# Patient Record
Sex: FEMALE | URBAN - METROPOLITAN AREA
[De-identification: names, ages, dates, MRNs, and addresses within clinical notes are randomized per-mention and may not be internally consistent; named-entity substitution may affect disease eponyms.]

---

## 2021-05-29 ENCOUNTER — RECORDS - HEALTHEAST (OUTPATIENT)
Dept: ADMINISTRATIVE | Facility: CLINIC | Age: 41
End: 2021-05-29

## 2024-12-04 ENCOUNTER — HOSPITAL ENCOUNTER (INPATIENT)
Facility: CLINIC | Age: 44
End: 2024-12-04
Attending: EMERGENCY MEDICINE | Admitting: INTERNAL MEDICINE
Payer: COMMERCIAL

## 2024-12-04 ENCOUNTER — APPOINTMENT (OUTPATIENT)
Dept: CT IMAGING | Facility: CLINIC | Age: 44
End: 2024-12-04
Attending: EMERGENCY MEDICINE
Payer: COMMERCIAL

## 2024-12-04 DIAGNOSIS — G03.0 ASEPTIC MENINGITIS: Primary | ICD-10-CM

## 2024-12-04 DIAGNOSIS — G43.811 OTHER MIGRAINE WITH STATUS MIGRAINOSUS, INTRACTABLE: ICD-10-CM

## 2024-12-04 DIAGNOSIS — I60.9 SUBARACHNOID HEMORRHAGE (H): ICD-10-CM

## 2024-12-04 LAB
ANION GAP SERPL CALCULATED.3IONS-SCNC: 11 MMOL/L (ref 7–15)
BASOPHILS # BLD AUTO: 0 10E3/UL (ref 0–0.2)
BASOPHILS NFR BLD AUTO: 0 %
BUN SERPL-MCNC: 12.2 MG/DL (ref 6–20)
CALCIUM SERPL-MCNC: 9.7 MG/DL (ref 8.8–10.4)
CHLORIDE SERPL-SCNC: 101 MMOL/L (ref 98–107)
CREAT SERPL-MCNC: 1.12 MG/DL (ref 0.51–0.95)
EGFRCR SERPLBLD CKD-EPI 2021: 61 ML/MIN/1.73M2
EOSINOPHIL # BLD AUTO: 0 10E3/UL (ref 0–0.7)
EOSINOPHIL NFR BLD AUTO: 1 %
ERYTHROCYTE [DISTWIDTH] IN BLOOD BY AUTOMATED COUNT: 11.5 % (ref 10–15)
GLUCOSE SERPL-MCNC: 99 MG/DL (ref 70–99)
HCO3 SERPL-SCNC: 27 MMOL/L (ref 22–29)
HCT VFR BLD AUTO: 39.9 % (ref 35–47)
HGB BLD-MCNC: 13.3 G/DL (ref 11.7–15.7)
IMM GRANULOCYTES # BLD: 0 10E3/UL
IMM GRANULOCYTES NFR BLD: 0 %
LYMPHOCYTES # BLD AUTO: 1.8 10E3/UL (ref 0.8–5.3)
LYMPHOCYTES NFR BLD AUTO: 29 %
MCH RBC QN AUTO: 31.4 PG (ref 26.5–33)
MCHC RBC AUTO-ENTMCNC: 33.3 G/DL (ref 31.5–36.5)
MCV RBC AUTO: 94 FL (ref 78–100)
MONOCYTES # BLD AUTO: 0.3 10E3/UL (ref 0–1.3)
MONOCYTES NFR BLD AUTO: 5 %
NEUTROPHILS # BLD AUTO: 4 10E3/UL (ref 1.6–8.3)
NEUTROPHILS NFR BLD AUTO: 64 %
NRBC # BLD AUTO: 0 10E3/UL
NRBC BLD AUTO-RTO: 0 /100
PLATELET # BLD AUTO: 169 10E3/UL (ref 150–450)
POTASSIUM SERPL-SCNC: 4.1 MMOL/L (ref 3.4–5.3)
RBC # BLD AUTO: 4.23 10E6/UL (ref 3.8–5.2)
SODIUM SERPL-SCNC: 139 MMOL/L (ref 135–145)
WBC # BLD AUTO: 6.2 10E3/UL (ref 4–11)

## 2024-12-04 PROCEDURE — 82310 ASSAY OF CALCIUM: CPT | Performed by: EMERGENCY MEDICINE

## 2024-12-04 PROCEDURE — 84157 ASSAY OF PROTEIN OTHER: CPT | Performed by: EMERGENCY MEDICINE

## 2024-12-04 PROCEDURE — 87015 SPECIMEN INFECT AGNT CONCNTJ: CPT | Performed by: EMERGENCY MEDICINE

## 2024-12-04 PROCEDURE — 86140 C-REACTIVE PROTEIN: CPT | Performed by: INTERNAL MEDICINE

## 2024-12-04 PROCEDURE — 82945 GLUCOSE OTHER FLUID: CPT | Performed by: EMERGENCY MEDICINE

## 2024-12-04 PROCEDURE — 250N000011 HC RX IP 250 OP 636: Performed by: EMERGENCY MEDICINE

## 2024-12-04 PROCEDURE — 89051 BODY FLUID CELL COUNT: CPT | Performed by: EMERGENCY MEDICINE

## 2024-12-04 PROCEDURE — 99291 CRITICAL CARE FIRST HOUR: CPT | Mod: 25

## 2024-12-04 PROCEDURE — 84520 ASSAY OF UREA NITROGEN: CPT | Performed by: EMERGENCY MEDICINE

## 2024-12-04 PROCEDURE — 87205 SMEAR GRAM STAIN: CPT | Performed by: EMERGENCY MEDICINE

## 2024-12-04 PROCEDURE — 009U3ZX DRAINAGE OF SPINAL CANAL, PERCUTANEOUS APPROACH, DIAGNOSTIC: ICD-10-PCS | Performed by: EMERGENCY MEDICINE

## 2024-12-04 PROCEDURE — 70496 CT ANGIOGRAPHY HEAD: CPT

## 2024-12-04 PROCEDURE — 36415 COLL VENOUS BLD VENIPUNCTURE: CPT | Performed by: EMERGENCY MEDICINE

## 2024-12-04 PROCEDURE — 258N000003 HC RX IP 258 OP 636: Performed by: EMERGENCY MEDICINE

## 2024-12-04 PROCEDURE — 80048 BASIC METABOLIC PNL TOTAL CA: CPT | Performed by: EMERGENCY MEDICINE

## 2024-12-04 PROCEDURE — 250N000013 HC RX MED GY IP 250 OP 250 PS 637: Performed by: EMERGENCY MEDICINE

## 2024-12-04 PROCEDURE — 96365 THER/PROPH/DIAG IV INF INIT: CPT | Mod: 59

## 2024-12-04 PROCEDURE — 85025 COMPLETE CBC W/AUTO DIFF WBC: CPT | Performed by: EMERGENCY MEDICINE

## 2024-12-04 PROCEDURE — 87483 CNS DNA AMP PROBE TYPE 12-25: CPT | Performed by: EMERGENCY MEDICINE

## 2024-12-04 PROCEDURE — 70450 CT HEAD/BRAIN W/O DYE: CPT

## 2024-12-04 PROCEDURE — 96375 TX/PRO/DX INJ NEW DRUG ADDON: CPT

## 2024-12-04 PROCEDURE — 62270 DX LMBR SPI PNXR: CPT

## 2024-12-04 PROCEDURE — 250N000009 HC RX 250: Performed by: EMERGENCY MEDICINE

## 2024-12-04 RX ORDER — LEVETIRACETAM 500 MG/1
500 TABLET ORAL EVERY EVENING
COMMUNITY
Start: 2024-06-10

## 2024-12-04 RX ORDER — LEVETIRACETAM 250 MG/1
250 TABLET ORAL EVERY MORNING
COMMUNITY
Start: 2024-06-10

## 2024-12-04 RX ORDER — METOCLOPRAMIDE HYDROCHLORIDE 5 MG/ML
10 INJECTION INTRAMUSCULAR; INTRAVENOUS ONCE
Status: COMPLETED | OUTPATIENT
Start: 2024-12-04 | End: 2024-12-04

## 2024-12-04 RX ORDER — ACETAMINOPHEN 500 MG
1000 TABLET ORAL ONCE
Status: COMPLETED | OUTPATIENT
Start: 2024-12-04 | End: 2024-12-04

## 2024-12-04 RX ORDER — DEXAMETHASONE SODIUM PHOSPHATE 10 MG/ML
10 INJECTION, SOLUTION INTRAMUSCULAR; INTRAVENOUS ONCE
Status: COMPLETED | OUTPATIENT
Start: 2024-12-04 | End: 2024-12-04

## 2024-12-04 RX ORDER — IOPAMIDOL 755 MG/ML
67 INJECTION, SOLUTION INTRAVASCULAR ONCE
Status: COMPLETED | OUTPATIENT
Start: 2024-12-04 | End: 2024-12-04

## 2024-12-04 RX ORDER — PROGESTERONE 100 MG/1
100 CAPSULE ORAL AT BEDTIME
COMMUNITY
Start: 2024-12-03

## 2024-12-04 RX ADMIN — DEXAMETHASONE SODIUM PHOSPHATE 10 MG: 10 INJECTION, SOLUTION INTRAMUSCULAR; INTRAVENOUS at 20:25

## 2024-12-04 RX ADMIN — MAGNESIUM SULFATE HEPTAHYDRATE 1 G: 500 INJECTION, SOLUTION INTRAMUSCULAR; INTRAVENOUS at 20:51

## 2024-12-04 RX ADMIN — SODIUM CHLORIDE 500 ML: 9 INJECTION, SOLUTION INTRAVENOUS at 20:25

## 2024-12-04 RX ADMIN — IOPAMIDOL 67 ML: 755 INJECTION, SOLUTION INTRAVENOUS at 20:48

## 2024-12-04 RX ADMIN — METOCLOPRAMIDE 10 MG: 5 INJECTION, SOLUTION INTRAMUSCULAR; INTRAVENOUS at 20:25

## 2024-12-04 RX ADMIN — SODIUM CHLORIDE 90 ML: 9 INJECTION, SOLUTION INTRAVENOUS at 20:42

## 2024-12-04 RX ADMIN — ACETAMINOPHEN 1000 MG: 500 TABLET, FILM COATED ORAL at 20:24

## 2024-12-04 ASSESSMENT — ACTIVITIES OF DAILY LIVING (ADL)
ADLS_ACUITY_SCORE: 41

## 2024-12-04 ASSESSMENT — COLUMBIA-SUICIDE SEVERITY RATING SCALE - C-SSRS
2. HAVE YOU ACTUALLY HAD ANY THOUGHTS OF KILLING YOURSELF IN THE PAST MONTH?: NO
6. HAVE YOU EVER DONE ANYTHING, STARTED TO DO ANYTHING, OR PREPARED TO DO ANYTHING TO END YOUR LIFE?: NO
1. IN THE PAST MONTH, HAVE YOU WISHED YOU WERE DEAD OR WISHED YOU COULD GO TO SLEEP AND NOT WAKE UP?: NO

## 2024-12-04 NOTE — LETTER
[facility] NEUROSCIENCE UNIT  [address] [place] [address]  Phone: [phone]    December 8, 2024        Keerthi Riddle  [address] [address]          To whom it may concern:    RE: Keerthi Riddle    Patient was seen and treated at our hospital 12/5-12/8/2024. She is medically stable to discharge home, but will need ongoing assistance from her  until she is able to mobilize independently.     Please contact me for questions or concerns.      Sincerely,    Caprice Jama MD

## 2024-12-05 ENCOUNTER — APPOINTMENT (OUTPATIENT)
Dept: MRI IMAGING | Facility: CLINIC | Age: 44
End: 2024-12-05
Attending: EMERGENCY MEDICINE
Payer: COMMERCIAL

## 2024-12-05 VITALS
OXYGEN SATURATION: 100 % | RESPIRATION RATE: 16 BRPM | DIASTOLIC BLOOD PRESSURE: 78 MMHG | HEART RATE: 61 BPM | HEIGHT: 66 IN | TEMPERATURE: 97.6 F | WEIGHT: 156 LBS | BODY MASS INDEX: 25.07 KG/M2 | SYSTOLIC BLOOD PRESSURE: 120 MMHG

## 2024-12-05 PROBLEM — I60.9 SUBARACHNOID HEMORRHAGE (H): Status: ACTIVE | Noted: 2024-12-05

## 2024-12-05 LAB
% BASOPHILS, CSF: 1 %
ALBUMIN UR-MCNC: NEGATIVE MG/DL
APPEARANCE CSF: ABNORMAL
APPEARANCE CSF: ABNORMAL
APPEARANCE UR: CLEAR
BILIRUB UR QL STRIP: NEGATIVE
C GATTII+NEOFOR DNA CSF QL NAA+NON-PROBE: NEGATIVE
CMV DNA CSF QL NAA+NON-PROBE: NEGATIVE
COLOR CSF: ABNORMAL
COLOR CSF: ABNORMAL
COLOR UR AUTO: ABNORMAL
CRP SERPL-MCNC: <3 MG/L
E COLI K1 AG CSF QL: NEGATIVE
EV RNA SPEC QL NAA+PROBE: NEGATIVE
GLUCOSE CSF-MCNC: 48 MG/DL (ref 40–70)
GLUCOSE UR STRIP-MCNC: NEGATIVE MG/DL
GP B STREP DNA CSF QL NAA+NON-PROBE: NEGATIVE
GRAM STAIN RESULT: NORMAL
GRAM STAIN RESULT: NORMAL
HAEM INFLU DNA CSF QL NAA+NON-PROBE: NEGATIVE
HGB UR QL STRIP: NEGATIVE
HHV6 DNA CSF QL NAA+NON-PROBE: NEGATIVE
HSV1 DNA CSF QL NAA+NON-PROBE: NEGATIVE
HSV2 DNA CSF QL NAA+NON-PROBE: NEGATIVE
KETONES UR STRIP-MCNC: NEGATIVE MG/DL
L MONOCYTOG DNA CSF QL NAA+NON-PROBE: NEGATIVE
LEUKOCYTE ESTERASE UR QL STRIP: NEGATIVE
LYMPH ABN NFR CSF MANUAL: 69 %
LYMPH ABN NFR CSF MANUAL: 73 %
MONOS+MACROS NFR CSF MANUAL: 19 %
MONOS+MACROS NFR CSF MANUAL: 23 %
N MEN DNA CSF QL NAA+NON-PROBE: NEGATIVE
NEUTROPHILS NFR CSF MANUAL: 7 %
NEUTROPHILS NFR CSF MANUAL: 8 %
NITRATE UR QL: NEGATIVE
PARECHOVIRUS A RNA CSF QL NAA+NON-PROBE: NEGATIVE
PH UR STRIP: 7.5 [PH] (ref 5–7)
PROT CSF-MCNC: 56.6 MG/DL (ref 15–45)
RBC # CSF MANUAL: 4000 /UL (ref 0–2)
RBC # CSF MANUAL: 4000 /UL (ref 0–2)
S PNEUM DNA CSF QL NAA+NON-PROBE: NEGATIVE
SP GR UR STRIP: 1.03 (ref 1–1.03)
TUBE # CSF: 1
TUBE # CSF: 4
UROBILINOGEN UR STRIP-MCNC: NORMAL MG/DL
VZV DNA CSF QL NAA+NON-PROBE: NEGATIVE
WBC # CSF MANUAL: 134 /UL (ref 0–5)
WBC # CSF MANUAL: 151 /UL (ref 0–5)

## 2024-12-05 PROCEDURE — 81025 URINE PREGNANCY TEST: CPT | Performed by: STUDENT IN AN ORGANIZED HEALTH CARE EDUCATION/TRAINING PROGRAM

## 2024-12-05 PROCEDURE — 250N000011 HC RX IP 250 OP 636: Performed by: EMERGENCY MEDICINE

## 2024-12-05 PROCEDURE — 255N000002 HC RX 255 OP 636: Performed by: EMERGENCY MEDICINE

## 2024-12-05 PROCEDURE — 70544 MR ANGIOGRAPHY HEAD W/O DYE: CPT

## 2024-12-05 PROCEDURE — 99223 1ST HOSP IP/OBS HIGH 75: CPT | Performed by: INTERNAL MEDICINE

## 2024-12-05 PROCEDURE — 81003 URINALYSIS AUTO W/O SCOPE: CPT | Performed by: INTERNAL MEDICINE

## 2024-12-05 PROCEDURE — A9585 GADOBUTROL INJECTION: HCPCS | Performed by: EMERGENCY MEDICINE

## 2024-12-05 PROCEDURE — 258N000003 HC RX IP 258 OP 636: Performed by: INTERNAL MEDICINE

## 2024-12-05 PROCEDURE — 70553 MRI BRAIN STEM W/O & W/DYE: CPT

## 2024-12-05 PROCEDURE — 120N000001 HC R&B MED SURG/OB

## 2024-12-05 PROCEDURE — 250N000013 HC RX MED GY IP 250 OP 250 PS 637: Performed by: INTERNAL MEDICINE

## 2024-12-05 PROCEDURE — 70549 MR ANGIOGRAPH NECK W/O&W/DYE: CPT

## 2024-12-05 PROCEDURE — 70546 MR ANGIOGRAPH HEAD W/O&W/DYE: CPT

## 2024-12-05 PROCEDURE — 99207 PR NO BILLABLE SERVICE THIS VISIT: CPT | Performed by: HOSPITALIST

## 2024-12-05 RX ORDER — ACETAMINOPHEN 325 MG/1
650 TABLET ORAL EVERY 4 HOURS PRN
Status: DISCONTINUED | OUTPATIENT
Start: 2024-12-05 | End: 2024-12-08 | Stop reason: HOSPADM

## 2024-12-05 RX ORDER — NALOXONE HYDROCHLORIDE 0.4 MG/ML
0.4 INJECTION, SOLUTION INTRAMUSCULAR; INTRAVENOUS; SUBCUTANEOUS
Status: DISCONTINUED | OUTPATIENT
Start: 2024-12-05 | End: 2024-12-08 | Stop reason: HOSPADM

## 2024-12-05 RX ORDER — ACETAMINOPHEN 650 MG/1
650 SUPPOSITORY RECTAL EVERY 4 HOURS PRN
Status: DISCONTINUED | OUTPATIENT
Start: 2024-12-05 | End: 2024-12-05

## 2024-12-05 RX ORDER — OXYCODONE HYDROCHLORIDE 5 MG/1
5 TABLET ORAL EVERY 4 HOURS PRN
Status: DISCONTINUED | OUTPATIENT
Start: 2024-12-05 | End: 2024-12-08 | Stop reason: HOSPADM

## 2024-12-05 RX ORDER — HYDRALAZINE HYDROCHLORIDE 20 MG/ML
10 INJECTION INTRAMUSCULAR; INTRAVENOUS EVERY 4 HOURS PRN
Status: DISCONTINUED | OUTPATIENT
Start: 2024-12-05 | End: 2024-12-08 | Stop reason: HOSPADM

## 2024-12-05 RX ORDER — AMOXICILLIN 250 MG
1 CAPSULE ORAL 2 TIMES DAILY PRN
Status: DISCONTINUED | OUTPATIENT
Start: 2024-12-05 | End: 2024-12-08 | Stop reason: HOSPADM

## 2024-12-05 RX ORDER — POLYETHYLENE GLYCOL 3350 17 G/17G
17 POWDER, FOR SOLUTION ORAL 2 TIMES DAILY PRN
Status: DISCONTINUED | OUTPATIENT
Start: 2024-12-05 | End: 2024-12-08 | Stop reason: HOSPADM

## 2024-12-05 RX ORDER — LEVETIRACETAM 500 MG/1
500 TABLET ORAL AT BEDTIME
Status: DISCONTINUED | OUTPATIENT
Start: 2024-12-05 | End: 2024-12-08 | Stop reason: HOSPADM

## 2024-12-05 RX ORDER — PROCHLORPERAZINE MALEATE 10 MG
10 TABLET ORAL EVERY 6 HOURS PRN
Status: DISCONTINUED | OUTPATIENT
Start: 2024-12-05 | End: 2024-12-08 | Stop reason: HOSPADM

## 2024-12-05 RX ORDER — ONDANSETRON 4 MG/1
4 TABLET, ORALLY DISINTEGRATING ORAL EVERY 6 HOURS PRN
Status: DISCONTINUED | OUTPATIENT
Start: 2024-12-05 | End: 2024-12-08 | Stop reason: HOSPADM

## 2024-12-05 RX ORDER — HYDROMORPHONE HYDROCHLORIDE 1 MG/ML
0.3 INJECTION, SOLUTION INTRAMUSCULAR; INTRAVENOUS; SUBCUTANEOUS
Status: DISCONTINUED | OUTPATIENT
Start: 2024-12-05 | End: 2024-12-08 | Stop reason: HOSPADM

## 2024-12-05 RX ORDER — ACETAMINOPHEN 650 MG/1
650 SUPPOSITORY RECTAL EVERY 4 HOURS PRN
Status: DISCONTINUED | OUTPATIENT
Start: 2024-12-05 | End: 2024-12-08 | Stop reason: HOSPADM

## 2024-12-05 RX ORDER — ANTIARTHRITIC COMBINATION NO.2 900 MG
5000 TABLET ORAL
COMMUNITY

## 2024-12-05 RX ORDER — HYDROMORPHONE HYDROCHLORIDE 1 MG/ML
0.5 INJECTION, SOLUTION INTRAMUSCULAR; INTRAVENOUS; SUBCUTANEOUS
Status: DISCONTINUED | OUTPATIENT
Start: 2024-12-05 | End: 2024-12-08 | Stop reason: HOSPADM

## 2024-12-05 RX ORDER — MULTIVIT WITH MINERALS/LUTEIN
1000 TABLET ORAL DAILY
COMMUNITY

## 2024-12-05 RX ORDER — LEVETIRACETAM 250 MG/1
250 TABLET ORAL EVERY MORNING
Status: DISCONTINUED | OUTPATIENT
Start: 2024-12-05 | End: 2024-12-08 | Stop reason: HOSPADM

## 2024-12-05 RX ORDER — ACETAMINOPHEN 325 MG/1
650 TABLET ORAL EVERY 4 HOURS PRN
Status: DISCONTINUED | OUTPATIENT
Start: 2024-12-05 | End: 2024-12-05

## 2024-12-05 RX ORDER — IBUPROFEN 200 MG
600 TABLET ORAL EVERY 4 HOURS PRN
COMMUNITY

## 2024-12-05 RX ORDER — BISACODYL 10 MG
10 SUPPOSITORY, RECTAL RECTAL DAILY PRN
Status: DISCONTINUED | OUTPATIENT
Start: 2024-12-05 | End: 2024-12-08 | Stop reason: HOSPADM

## 2024-12-05 RX ORDER — AMOXICILLIN 250 MG
2 CAPSULE ORAL 2 TIMES DAILY PRN
Status: DISCONTINUED | OUTPATIENT
Start: 2024-12-05 | End: 2024-12-08 | Stop reason: HOSPADM

## 2024-12-05 RX ORDER — LIDOCAINE 40 MG/G
CREAM TOPICAL
Status: DISCONTINUED | OUTPATIENT
Start: 2024-12-05 | End: 2024-12-08 | Stop reason: HOSPADM

## 2024-12-05 RX ORDER — NALOXONE HYDROCHLORIDE 0.4 MG/ML
0.2 INJECTION, SOLUTION INTRAMUSCULAR; INTRAVENOUS; SUBCUTANEOUS
Status: DISCONTINUED | OUTPATIENT
Start: 2024-12-05 | End: 2024-12-08 | Stop reason: HOSPADM

## 2024-12-05 RX ORDER — GADOBUTROL 604.72 MG/ML
7 INJECTION INTRAVENOUS ONCE
Status: COMPLETED | OUTPATIENT
Start: 2024-12-05 | End: 2024-12-05

## 2024-12-05 RX ORDER — ACETAMINOPHEN 325 MG/1
650 TABLET ORAL EVERY 6 HOURS PRN
COMMUNITY

## 2024-12-05 RX ORDER — LORAZEPAM 2 MG/ML
2 INJECTION INTRAMUSCULAR ONCE
Status: COMPLETED | OUTPATIENT
Start: 2024-12-05 | End: 2024-12-05

## 2024-12-05 RX ORDER — ONDANSETRON 2 MG/ML
4 INJECTION INTRAMUSCULAR; INTRAVENOUS EVERY 6 HOURS PRN
Status: DISCONTINUED | OUTPATIENT
Start: 2024-12-05 | End: 2024-12-08 | Stop reason: HOSPADM

## 2024-12-05 RX ORDER — PROGESTERONE 100 MG/1
100 CAPSULE ORAL AT BEDTIME
Status: DISCONTINUED | OUTPATIENT
Start: 2024-12-05 | End: 2024-12-08 | Stop reason: HOSPADM

## 2024-12-05 RX ADMIN — ACETAMINOPHEN 650 MG: 325 TABLET, FILM COATED ORAL at 23:22

## 2024-12-05 RX ADMIN — SODIUM CHLORIDE, POTASSIUM CHLORIDE, SODIUM LACTATE AND CALCIUM CHLORIDE 1000 ML: 600; 310; 30; 20 INJECTION, SOLUTION INTRAVENOUS at 06:24

## 2024-12-05 RX ADMIN — LEVETIRACETAM 250 MG: 250 TABLET, FILM COATED ORAL at 09:31

## 2024-12-05 RX ADMIN — LORAZEPAM 2 MG: 2 INJECTION INTRAMUSCULAR; INTRAVENOUS at 01:44

## 2024-12-05 RX ADMIN — LEVETIRACETAM 500 MG: 500 TABLET, FILM COATED ORAL at 21:37

## 2024-12-05 RX ADMIN — GADOBUTROL 7 ML: 604.72 INJECTION INTRAVENOUS at 02:46

## 2024-12-05 RX ADMIN — PROGESTERONE 100 MG: 100 CAPSULE ORAL at 23:20

## 2024-12-05 ASSESSMENT — ACTIVITIES OF DAILY LIVING (ADL)
ADLS_ACUITY_SCORE: 15
ADLS_ACUITY_SCORE: 15
ADLS_ACUITY_SCORE: 16
ADLS_ACUITY_SCORE: 16
ADLS_ACUITY_SCORE: 15
ADLS_ACUITY_SCORE: 41
ADLS_ACUITY_SCORE: 41
ADLS_ACUITY_SCORE: 15
ADLS_ACUITY_SCORE: 16
ADLS_ACUITY_SCORE: 15
ADLS_ACUITY_SCORE: 16
ADLS_ACUITY_SCORE: 15
ADLS_ACUITY_SCORE: 16
ADLS_ACUITY_SCORE: 41
ADLS_ACUITY_SCORE: 16

## 2024-12-05 NOTE — H&P
[facility]    History and Physical - Hospitalist Service       Date of Admission:  12/4/2024    Assessment & Plan      Keerthi Riddle is a 44 year old female with a history of seizures admitted on 12/4/2024 with over 1 week of headache associated initially with acute onset and intense pain with nausea and emesis, whooshing tinnitus intermittently, neck stiffness which has been improving, some sensation of tightness in her hamstrings when standing without clear sciatica, positional worsening of headache and lower extremity symptoms when standing.  Found to have xanthochromic CSF.  Multiple imaging studies negative for acute pathology.    Headache with improving meningismus: Thunderclap headache, possible occult ICH.  Sudden onset thunderclap headache on November 26.  History of nocturnal seizures, but no recent seizures reported and has been adherent to her Keppra prescriptions.  No fevers.  Symptoms worsen if she is sitting or standing for a prolonged period of time.  Xanthochromic lumbar puncture.  Gram stain negative.  -UA with micro, add on CRP.  Low suspicion for vasculitis with negative imaging, but if hematuria and elevated CRP, might be more concerning for systemic vasculitic process.  -Systolic blood pressure goal less than 150.  Has been normotensive  -Meningitis panel pending.  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  HSV meningitis can also be associated with some sacral and lumbar radiculopathies.  Unclear of benefit of antivirals now 1 week out from symptoms, but would likely initiate if positive studies  -Stroke neurology consulted.  Aware of patient from the emergency department.  All imaging findings are reassuring.  With patient's positional symptoms, question if she might benefit from a blood patch empirically, but would await meningitis/HSV panel first.  This was discussed  "with patient on admission as well.  -1 L LR bolus.  -Acetaminophen, oxycodone, IV Dilaudid available as needed for pain control  -IV hydralazine available if needed for systolic blood pressure greater than 150.    Seizure disorder: Has had witnessed tonic-clonic events in 2019 with posturing and postictal state consistent with seizure.  Normal EEG at that time.  Has also had some subsequent parasomnia versus petit mal seizures.  Follows with neurology as an outpatient and is maintained on Keppra with no recurrent episodes when she has been compliant with her dosing schedule  -Continue Keppra 500 mg at bedtime, 250 mg every morning              Diet:  Regular adult diet  DVT Prophylaxis: Pneumatic Compression Devices  [place] Catheter: Not present  Lines: None     Cardiac Monitoring: None  Code Status:  Full code    Clinically Significant Risk Factors Present on Admission                             # Overweight: Estimated body mass index is 25.18 kg/m  as calculated from the following:    Height as of this encounter: 1.676 m (5' 6\").    Weight as of this encounter: 70.8 kg (156 lb).              Disposition Plan     Medically Ready for Discharge: Anticipated Today pending symptom improvement, neurology consultation, return of CSF panel, possible blood patch           Zack Pompa MD  Hospitalist Service  [facility]  Securely message with [employer] (more info)  Text page via [facility] Paging/Directory     ______________________________________________________________________    Chief Complaint   Headache    History is obtained from the patient, chart review, discussion with Dr. Interiano in the emergency department, review of outside records including recent follow-up through [facility] system    History of Present Illness   Keerthi Riddle is a 44 year old female who presents to [facility] department for evaluation of headache since November 26.    Keerthi has " a history of nocturnal seizures for which she is maintained on Keppra, and while on Keppra has not had issues with seizures.  Seizures initially diagnosed in 2019 with some overlap concern of potential alcohol abuse at that time.    Patient had new onset headache starting Tuesday, November 26.  Sudden onset, not associated with trauma or activity.    Reports she had pain throughout her whole head, primarily bifrontal and at base of neck.  With persistent waxing and waning headache since November 26, she contacted the nurse line through her [facility] system on December 1, was seen December 2 at urgent care and prescribed acetaminophen, December 3 for follow-up with her primary care provider.  At December 3 visit describes some stabbing pain behind her left eye and tingling of her scalp as well as tightness in her hamstring when she would have headache pain.  Was rotating Advil 3 600 mg with Tylenol 650 mg every 6 hours.  At that visit she also reported having a stiff neck with her headache, noted a plan to follow with a chiropractor after PCP visit.  She received Toradol for her headache.    At chiropractor visit integrative health visit, it was noted that her headache was associated with an aura.  Came on while her head was flexed while dying her hair, and with her abrupt pain she subsequently had an episode of emesis.    After chiropractor visit, she was placed in position with her head rotated and extended for 30 seconds and reported feeling dizzy.  Was felt to be a contraindication for cervical spine manipulation given positive vertebral artery test.  She also noted pulsations with her headache and a bilateral sciatica type pain.  Chiropractor recommended follow-up with neurology again with her sudden onset headache symptoms.    When she was able to get her neurology visit scheduled, it was not available for 2 weeks.  Contacted her family medicine clinic 12/4/2024 regarding this, and subsequently presented to the  emergency department for evaluation.    In the emergency department she had a CT which was unrevealing, underwent lumbar puncture with red cells throughout CSF.  Stroke neurology was contacted and recommended imaging studies including MRI, MRV.  These were all unrevealing for any occult bleeding source or venous thrombosis.  No evidence of ischemia, mass, hemorrhage either intraparenchymal or intraventricular, no dissection, no significant stenoses, no aneurysms, no vascular malformations.    Patient has had no fevers or chills.    Family has not been ill.    When discussing patient's possible aura, it does not appear that she had an aura at all.  She tells me that she was dying her hair and had sudden onset headache that was rated 10 out of 10.  With her 10 out of 10 pain, she became somewhat panicked and hyperfocused on what needed to be done.  She recalls feeling that it was impractical that she currently had hair dye in her hair, felt the need to contact her neighbor and have her present while waiting for her  to come home to make sure that she was safe.  Was thinking very clearly per  at bedside.  No blurred vision.    Her episodes of emesis happened 1 to 2 hours after her onset of headache.  She tells me that she had 1 episode of large-volume nonbloody emesis followed by a few episodes, 2, of mostly retching that same day.  Has not had nausea and vomiting since, but has had some decreased appetite.    Overall, her headache and symptoms have been improving.  She clearly describes improvement in meningismus over the past week and is able to move her neck freely without difficulty.  She still has reproducible increase in headache when she stands.  She tells me if she stands for a few minutes at a time, she will have clear worsening of her headache and hamstring discomfort.  Even has increased headache when she is sitting up.  She has not been driving or leaving the house over the past several days  because of this.    Does not have any photo or phonophobia.      Past Medical History    Seizures  HPV    Past Surgical History   LEEP     Prior to Admission Medications   Prior to Admission Medications   Prescriptions Last Dose Informant Patient Reported? Taking?   HYDROCODONE-ACETAMINOPHEN  MG OR TABS   No No   Sig: TAKE 1 TO 2 TABLETS EVER 4 TO 6 HOURS AS NEEDED FOR PAIN   VALIUM 5 MG OR TABS   No No   Si TABLET at nighttime for muscle relaxant as needed.   VICODIN 5-500 MG OR TABS   Yes No   Si TABLET EVERY 4 TO 6 HOURS AS NEEDED   levETIRAcetam (KEPPRA) 250 MG tablet   Yes Yes   Sig: Take 1 tablet by mouth every morning.   levETIRAcetam (KEPPRA) 500 MG tablet   Yes Yes   Sig: Take 500 mg by mouth.   progesterone (PROMETRIUM) 100 MG capsule   Yes Yes   Sig: Take 1 capsule by mouth at bedtime.      Facility-Administered Medications: None           Physical Exam   Vital Signs: Temp: 97  F (36.1  C) Temp src: Temporal BP: 98/69 Pulse: 75   Resp: 12 SpO2: 97 % O2 Device: None (Room air)    Weight: 156 lbs 0 oz    General Appearance: Well-appearing 44-year-old female in no acute distress.    Eyes: No scleral icterus or injection  HEENT: Normocephalic and atraumatic  Respiratory: Breath sounds are clear bilaterally to auscultation with no wheezes or crackles.  Cardiovascular: Regular rate and rhythm.  No murmur  GI: Abdomen soft, nontender palpation.  No palpable mass.  Lymph/Hematologic: No lower extremity edema,   Skin: No rash or jaundice  Musculoskeletal: Muscular tone and bulk intact in all extremities and appropriate for age.  No tenderness to palpation of cervical spine.  Neurologic: Alert, conversant, appropriate in conversation.  She is able to move her neck freely without any clear meningismus, but reports that she does have some worsening of her headache with this.  Tells me that  her neck stiffness has improved over the past week.  Does have increased headache when sitting upright.   Straight leg raise bilaterally results and discomfort in her hamstrings, but not shooting pain down her legs.  Does not have worsened headache with this to suggest Niteshki, but does have a history of similar description by outside records.  Psychiatric: very pleasant, normal affect    Medical Decision Making       85 MINUTES SPENT BY ME on the date of service doing chart review, history, exam, documentation & further activities per the note.      Data     I have personally reviewed the following data over the past 24 hrs:    6.2  \   13.3   / 169     139 101 12.2 /  99   4.1 27 1.12 (H) \       Imaging results reviewed over the past 24 hrs:   Recent Results (from the past 24 hours)   CT Head w/o Contrast    Narrative    EXAM: CT HEAD W/O CONTRAST, CTA HEAD NECK W CONTRAST  LOCATION: [facility]  DATE: 12/4/2024    INDICATION: Headache and neck pain  COMPARISON: None  CONTRAST: 67mL Isovue 370  TECHNIQUE: Head and neck CT angiogram with IV contrast. Noncontrast head CT followed by axial helical CT images of the head and neck vessels obtained during the arterial phase of intravenous contrast administration. Axial 2D reconstructed images and   multiplanar 3D MIP reconstructed images of the head and neck vessels were performed by the technologist. Dose reduction techniques were used. All stenosis measurements made according to NASCET criteria unless otherwise specified.    FINDINGS:   NONCONTRAST HEAD CT:   INTRACRANIAL CONTENTS: Portions of the posterior fossa are obscured by streak artifact. No acute intracranial hemorrhage, extraaxial collection, or mass effect. No evidence of an acute transcortical confluent infarct. Normal parenchymal attenuation.   Normal ventricles and sulci for age.     VISUALIZED ORBITS/SINUSES/MASTOIDS: No acute intraorbital finding. No significant paranasal sinus or mastoid mucosal disease.     BONES/SOFT TISSUES: No acute abnormality.    HEAD CTA:  ANTERIOR  CIRCULATION: No high-grade stenosis, occlusion, aneurysm, or high-flow vascular malformation. A severely hypoplastic P1 segment of the left posterior cerebral artery with a patent left posterior communicating artery.    POSTERIOR CIRCULATION: No high-grade stenosis, occlusion, aneurysm, or high-flow vascular malformation. A hypoplastic right vertebral artery predominantly terminating as the right posterior inferior cerebellar artery.     DURAL VENOUS SINUSES: Expected enhancement of the major dural venous sinuses. Please note that this exam is not specifically tailored for the assessment of the intracranial venous structures.    NECK CTA:  RIGHT CAROTID: No hemodynamically significant stenosis or dissection.    LEFT CAROTID: No hemodynamically significant stenosis or dissection.    VERTEBRAL ARTERIES: No focal high-grade stenosis or dissection. Balanced vertebral arteries.    AORTIC ARCH: Incidentally noted aberrant right subclavian artery without aneurysmal dilatation at its origin. A common origin of both common carotid arteries. Patent visualized thoracic aorta, aortic arch, and proximal great vessels without significant   atherosclerotic disease or stenosis.    NONVASCULAR STRUCTURES: Straightening of the normal cervical lordosis with 2-3 mm likely degenerative anterolisthesis at C6-C7 in the setting of mild-moderate left C5-C7 facet arthrosis. Multilevel interbody degenerative change including mild-moderate   intervertebral disc height loss at C4-C5 and C6-C7, mild at C5-C6. While assessment is limited due to streak artifact, there is likely mild spinal canal stenosis at C5-C6. Moderate left C5-C7 foraminal narrowing, mild-moderate at C4-C5 on the left. Clear   visualized lungs.      Impression    IMPRESSION:   HEAD CT:  1.  No acute intracranial abnormality.    HEAD CTA:   1.  No large vessel occlusion, high-grade stenosis, aneurysm, or high-flow vascular malformation.    NECK CTA:  1.  No hemodynamically  significant stenosis or dissection in the neck vessels.  2.  Incidental aberrant right subclavian artery.  3.  Multilevel cervical spondylosis, as described.   CT Head Neck Angio w/o & w Contrast    Narrative    EXAM: CT HEAD W/O CONTRAST, CTA HEAD NECK W CONTRAST  LOCATION: [facility]  DATE: 12/4/2024    INDICATION: Headache and neck pain  COMPARISON: None  CONTRAST: 67mL Isovue 370  TECHNIQUE: Head and neck CT angiogram with IV contrast. Noncontrast head CT followed by axial helical CT images of the head and neck vessels obtained during the arterial phase of intravenous contrast administration. Axial 2D reconstructed images and   multiplanar 3D MIP reconstructed images of the head and neck vessels were performed by the technologist. Dose reduction techniques were used. All stenosis measurements made according to NASCET criteria unless otherwise specified.    FINDINGS:   NONCONTRAST HEAD CT:   INTRACRANIAL CONTENTS: Portions of the posterior fossa are obscured by streak artifact. No acute intracranial hemorrhage, extraaxial collection, or mass effect. No evidence of an acute transcortical confluent infarct. Normal parenchymal attenuation.   Normal ventricles and sulci for age.     VISUALIZED ORBITS/SINUSES/MASTOIDS: No acute intraorbital finding. No significant paranasal sinus or mastoid mucosal disease.     BONES/SOFT TISSUES: No acute abnormality.    HEAD CTA:  ANTERIOR CIRCULATION: No high-grade stenosis, occlusion, aneurysm, or high-flow vascular malformation. A severely hypoplastic P1 segment of the left posterior cerebral artery with a patent left posterior communicating artery.    POSTERIOR CIRCULATION: No high-grade stenosis, occlusion, aneurysm, or high-flow vascular malformation. A hypoplastic right vertebral artery predominantly terminating as the right posterior inferior cerebellar artery.     DURAL VENOUS SINUSES: Expected enhancement of the major dural venous sinuses. Please  note that this exam is not specifically tailored for the assessment of the intracranial venous structures.    NECK CTA:  RIGHT CAROTID: No hemodynamically significant stenosis or dissection.    LEFT CAROTID: No hemodynamically significant stenosis or dissection.    VERTEBRAL ARTERIES: No focal high-grade stenosis or dissection. Balanced vertebral arteries.    AORTIC ARCH: Incidentally noted aberrant right subclavian artery without aneurysmal dilatation at its origin. A common origin of both common carotid arteries. Patent visualized thoracic aorta, aortic arch, and proximal great vessels without significant   atherosclerotic disease or stenosis.    NONVASCULAR STRUCTURES: Straightening of the normal cervical lordosis with 2-3 mm likely degenerative anterolisthesis at C6-C7 in the setting of mild-moderate left C5-C7 facet arthrosis. Multilevel interbody degenerative change including mild-moderate   intervertebral disc height loss at C4-C5 and C6-C7, mild at C5-C6. While assessment is limited due to streak artifact, there is likely mild spinal canal stenosis at C5-C6. Moderate left C5-C7 foraminal narrowing, mild-moderate at C4-C5 on the left. Clear   visualized lungs.      Impression    IMPRESSION:   HEAD CT:  1.  No acute intracranial abnormality.    HEAD CTA:   1.  No large vessel occlusion, high-grade stenosis, aneurysm, or high-flow vascular malformation.    NECK CTA:  1.  No hemodynamically significant stenosis or dissection in the neck vessels.  2.  Incidental aberrant right subclavian artery.  3.  Multilevel cervical spondylosis, as described.

## 2024-12-05 NOTE — PLAN OF CARE
"Reason for Admission: Headache x1 wk with vomiting    Cognitive/Mentation: A/Ox 4  Neuros/CMS: Intact. Slightly decreased mobility in BLE secondary to pain. Steady on feet.  VS: VSS. SBP < 150 throughout shift.   Tele: NA.  /GI: WNL. Continent. Last BM today.   Pulmonary: LS clear, denies SOB.  Pain: Intermittent pain to both head and bilateral buttocks. Reports headache pain-max a 4/10. Declined medication intervention. Heat packs applied to hips/buttocks and slightly helpful.     Drains/Lines: PIV SL  Skin: WNL  Activity: Indpt in room when  is present, SBA if he leaves room/patient wants to walk hallway  Diet: Regular with thin liquids. Takes pills whole with water.     Therapies recs: TBD  Discharge: Pending     Aggression Stoplight Tool: Green    End of shift summary: Lumbar puncture results pending. Awaiting gen neuro consult. Seizure precautions maintained. Pt reports intermittent episodes prior to admission that involve her \"spacing out\" when she's home by her self.  states he has not witnessed her in a situation where she is talking and then stops and has one of these episodes. Sticky note left for MD regarding this. Gen neuro rounded; recommending neurostroke consult for thunderclap headache/bleed. Consult placed.       "

## 2024-12-05 NOTE — PHARMACY-ADMISSION MEDICATION HISTORY
Pharmacist Admission Medication History    Admission medication history is complete. The information provided in this note is only as accurate as the sources available at the time of the update.    Information Source(s): Patient and CareEverywhere/SureScripts via in-person    Pertinent Information:   Added multiple supplements with unknown strengths - indicated 1 dose for these as patient is unsure number of pills to complete dose based on instructions on each bottle.  Added Lions Usman and Cordyseps mushrooms which patient states she has been taking but stopped about 1 week prior to admission when symptoms started. Cordyseps mushrooms were new for her about 2 days prior to symptoms starting. Added these and marked as not taking. Also added melatonin + CBD and marked as not taking since pt only uses this if able to find at store for purchase (currently uses melatonin 1 mg alone).    Changes made to PTA medication list:  Added: All supplements  Deleted: hydrocodone-acetaminophen x2, Valium  Changed: added pm instructions to Keppra 500 mg    Allergies reviewed with patient and updates made in EHR: yes    Medication History Completed By: Ethel Gallegos [facility] 12/5/2024 11:18 AM    PTA Med List   Medication Sig Note Last Dose/Taking    5-HTP CAPS Take 1 Dose by mouth at bedtime.  Taking    acetaminophen (TYLENOL) 325 MG tablet Take 650 mg by mouth every 6 hours as needed for mild pain.  Taking As Needed    Acetylcysteine (NAC PO) Take 1 Dose by mouth at bedtime.  Taking    Biotin 5000 MCG TABS Take 5,000 mcg by mouth three times a week. Takes a few times a week as remembers (does not take daily given high dose).  Past Week    cholecalciferol (VITAMIN D3) 125 mcg (5000 units) capsule Take 125 mcg by mouth daily.  Taking    Coenzyme Q10 (CO Q-10 PO) Take 1 Dose by mouth daily.  Taking    CRANBERRY EXTRACT PO Take 1 Dose by mouth daily.  Taking    FOLIC ACID PO Take 1 Dose by mouth daily.  Taking    ibuprofen (ADVIL/MOTRIN)  200 MG tablet Take 600 mg by mouth every 4 hours as needed for pain.  Taking As Needed    levETIRAcetam (KEPPRA) 250 MG tablet Take 250 mg by mouth every morning.  12/4/2024 Morning    levETIRAcetam (KEPPRA) 500 MG tablet Take 500 mg by mouth every evening.  12/4/2024 Evening    melatonin 1 MG TABS tablet Take 1 mg by mouth at bedtime.  Taking    MILK THISTLE PO Take 2 capsules by mouth daily.  Taking    Omega-3 Fatty Acids (FISH OIL PO) Take 1 capsule by mouth daily.  Ameya's Supplement  Taking    Prasterone, DHEA, (DHEA PO) Take 1 Dose by mouth daily. Pure Encapsulations Brand  Taking    progesterone (PROMETRIUM) 100 MG capsule Take 100 mg by mouth at bedtime. 12/5/2024: Started on 12/3/24; has taken 2 doses prior to admission 12/4/2024 Evening    THEANINE PO Take 1 Dose by mouth daily.  Taking    TURMERIC PO Take 600 mg by mouth daily.  Taking    UNABLE TO FIND Take 2 tablets by mouth daily. MEDICATION NAME: Bone-Up Supplement  Taking    UNABLE TO FIND Take 1 Dose by mouth daily. MEDICATION NAME: Pure Encapsulations DopaPlus  Taking    UNABLE TO FIND Take 5 mg by mouth daily. MEDICATION NAME: Pure Encapsulations Lithium (orotate)  Taking    vitamin C (ASCORBIC ACID) 1000 MG TABS Take 1,000 mg by mouth daily.  Taking

## 2024-12-05 NOTE — CONSULTS
DATE OF SERVICE : 12/5/2024    DATE OF ADMISSION: 12/4/2024    NEUROLOGICAL CONSULTATION    REQUESTED BY Savanah Arboleda MD    SOURCE OF INFORMATION:Patient and  EHR    REASON FOR CONSULTATION:     Headache    HISTORY OF PRESENT ILLNESS:       She is a 44 years old female presenting for evaluation regarding headaches.  She has no prior history of migraines new onset headache started 11/26.  Headache located bifrontal temporal retro-orbital thunderclap.  Associated vomiting.  Patient in general has light sensitivity with or without the headache.  She has ooshing/heart beat sensation in ears.  She also has associated pain in the back of the hamstrings and glutes with neck flexion.  Initially she was medicated with Advil and Excedrin Migraine for initial 3 days and sleep.  When she is resting headache is better when she is moving upper and around it is debilitating.  Currently headache intensity is better 5 when she is resting in bed when walking around with 6    Medications Prior to Admission   Medication Sig Dispense Refill Last Dose/Taking    5-HTP CAPS Take 1 Dose by mouth at bedtime.   Taking    acetaminophen (TYLENOL) 325 MG tablet Take 650 mg by mouth every 6 hours as needed for mild pain.   Taking As Needed    Acetylcysteine (NAC PO) Take 1 Dose by mouth at bedtime.   Taking    Biotin 5000 MCG TABS Take 5,000 mcg by mouth three times a week. Takes a few times a week as remembers (does not take daily given high dose).   Past Week    cholecalciferol (VITAMIN D3) 125 mcg (5000 units) capsule Take 125 mcg by mouth daily.   Taking    Coenzyme Q10 (CO Q-10 PO) Take 1 Dose by mouth daily.   Taking    CRANBERRY EXTRACT PO Take 1 Dose by mouth daily.   Taking    FOLIC ACID PO Take 1 Dose by mouth daily.   Taking    ibuprofen (ADVIL/MOTRIN) 200 MG tablet Take 600 mg by mouth every 4 hours as needed for pain.   Taking As Needed    levETIRAcetam (KEPPRA) 250 MG tablet Take 250 mg by mouth every morning.    12/4/2024 Morning    levETIRAcetam (KEPPRA) 500 MG tablet Take 500 mg by mouth every evening.   12/4/2024 Evening    melatonin 1 MG TABS tablet Take 1 mg by mouth at bedtime.   Taking    MILK THISTLE PO Take 2 capsules by mouth daily.   Taking    Omega-3 Fatty Acids (FISH OIL PO) Take 1 capsule by mouth daily.  Ameya's Supplement   Taking    Prasterone, DHEA, (DHEA PO) Take 1 Dose by mouth daily. Pure Encapsulations Brand   Taking    progesterone (PROMETRIUM) 100 MG capsule Take 100 mg by mouth at bedtime.   12/4/2024 Evening    THEANINE PO Take 1 Dose by mouth daily.   Taking    TURMERIC PO Take 600 mg by mouth daily.   Taking    UNABLE TO FIND Take 2 tablets by mouth daily. MEDICATION NAME: Bone-Up Supplement   Taking    UNABLE TO FIND Take 1 Dose by mouth daily. MEDICATION NAME: Pure Encapsulations DopaPlus   Taking    UNABLE TO FIND Take 5 mg by mouth daily. MEDICATION NAME: Pure Encapsulations Lithium (orotate)   Taking    vitamin C (ASCORBIC ACID) 1000 MG TABS Take 1,000 mg by mouth daily.   Taking    UNABLE TO FIND Take 1 Dose by mouth daily. MEDICATION NAME: Lion's Usman Supplement (Patient not taking: Reported on 12/5/2024)   Not Taking    UNABLE TO FIND Take 1 Dose by mouth daily. MEDICATION NAME: Cordyseps Mushrooms (Patient not taking: Reported on 12/5/2024)   Not Taking    UNABLE TO FIND Take 1 capsule by mouth at bedtime. MEDICATION NAME: CBD + Melatonin 3 mg  --> Uses instead of melatonin 1 mg when available to purchase.          Current Facility-Administered Medications   Medication Dose Route Frequency Provider Last Rate Last Admin    acetaminophen (TYLENOL) tablet 650 mg  650 mg Oral Q4H PRN Peña, Zack Castaneda MD        Or    acetaminophen (TYLENOL) Suppository 650 mg  650 mg Rectal Q4H PRN Zack Pompa MD        bisacodyl (DULCOLAX) suppository 10 mg  10 mg Rectal Daily PRN Zack Pompa MD        hydrALAZINE (APRESOLINE) injection 10 mg  10 mg Intravenous Q4H PRN Zack Pompa  MD        HYDROmorphone (PF) (DILAUDID) injection 0.3 mg  0.3 mg Intravenous Q2H PRN Pompa, Zack Castaneda MD        HYDROmorphone (PF) (DILAUDID) injection 0.5 mg  0.5 mg Intravenous Q2H PRN Pompa, Zack Castaneda MD        levETIRAcetam (KEPPRA) tablet 250 mg  250 mg Oral QAM Pompa, Zack Castaneda MD   250 mg at 12/05/24 0931    levETIRAcetam (KEPPRA) tablet 500 mg  500 mg Oral At Bedtime Pompa, Zack Castaneda MD        lidocaine (LMX4) cream   Topical Q1H PRN Pompa, Zack Castaneda MD        lidocaine 1 % 0.1-1 mL  0.1-1 mL Other Q1H PRN Pompa, Zack Castaneda MD        melatonin tablet 5 mg  5 mg Oral At Bedtime PRN Pompa, Zack Castaneda MD        naloxone (NARCAN) injection 0.2 mg  0.2 mg Intravenous Q2 Min PRN Pompa, Zack Castaneda MD        Or    naloxone (NARCAN) injection 0.4 mg  0.4 mg Intravenous Q2 Min PRN Pompa, Zack Castaneda MD        Or    naloxone (NARCAN) injection 0.2 mg  0.2 mg Intramuscular Q2 Min PRN Pompa, Zack Castaneda MD        Or    naloxone (NARCAN) injection 0.4 mg  0.4 mg Intramuscular Q2 Min PRN Pompa, Zack Castaneda MD        ondansetron (ZOFRAN ODT) ODT tab 4 mg  4 mg Oral Q6H PRN Pompa, Zack Castaneda MD        Or    ondansetron (ZOFRAN) injection 4 mg  4 mg Intravenous Q6H PRN Pompa, Zack Castaneda MD        oxyCODONE (ROXICODONE) tablet 5 mg  5 mg Oral Q4H PRN Pompa, Zack Castaneda MD        oxyCODONE IR (ROXICODONE) half-tab 2.5 mg  2.5 mg Oral Q4H PRN Pompa, Zack Castaneda MD        polyethylene glycol (MIRALAX) Packet 17 g  17 g Oral BID PRN Pompa, Zack Castaneda MD        prochlorperazine (COMPAZINE) injection 10 mg  10 mg Intravenous Q6H PRN Pompa, Zack Castaneda MD        Or    prochlorperazine (COMPAZINE) tablet 10 mg  10 mg Oral Q6H PRN Pompa, Zack Castaneda MD        senna-docusate (SENOKOT-S/PERICOLACE) 8.6-50 MG per tablet 1 tablet  1 tablet Oral BID PRN Zack Pompa MD        Or    senna-docusate (SENOKOT-S/PERICOLACE) 8.6-50 MG per tablet 2 tablet  2 tablet Oral BID PRN Zack Pompa MD        sodium chloride  (PF) 0.9% PF flush 3 mL  3 mL Intracatheter q1 min prn Pompa, Zack Castaneda MD        sodium chloride (PF) 0.9% PF flush 3 mL  3 mL Intracatheter Q8H Pompa, Zack Castaneda MD   3 mL at 12/05/24 1320     Current Facility-Administered Medications   Medication Dose Route Frequency Provider Last Rate Last Admin    acetaminophen (TYLENOL) tablet 650 mg  650 mg Oral Q4H PRN Pompa, Zack Castaneda MD        Or    acetaminophen (TYLENOL) Suppository 650 mg  650 mg Rectal Q4H PRN Pompa, Zack Castaneda MD        bisacodyl (DULCOLAX) suppository 10 mg  10 mg Rectal Daily PRN Pomap, Zack Castaneda MD        hydrALAZINE (APRESOLINE) injection 10 mg  10 mg Intravenous Q4H PRN Pompa, Zack Castaneda MD        HYDROmorphone (PF) (DILAUDID) injection 0.3 mg  0.3 mg Intravenous Q2H PRN Pompa, Zack Castaneda MD        HYDROmorphone (PF) (DILAUDID) injection 0.5 mg  0.5 mg Intravenous Q2H PRN Pompa, Zack Castaneda MD        levETIRAcetam (KEPPRA) tablet 250 mg  250 mg Oral QAM Pompa, Zack Castaneda MD   250 mg at 12/05/24 0931    levETIRAcetam (KEPPRA) tablet 500 mg  500 mg Oral At Bedtime Pompa, Zack Castaneda MD        lidocaine (LMX4) cream   Topical Q1H PRN Pompa, Zack Castaneda MD        lidocaine 1 % 0.1-1 mL  0.1-1 mL Other Q1H PRN Pompa, Zack Castaneda MD        melatonin tablet 5 mg  5 mg Oral At Bedtime PRN Pompa, Zack Castaneda MD        naloxone (NARCAN) injection 0.2 mg  0.2 mg Intravenous Q2 Min PRN Pompa, Zack Castaneda MD        Or    naloxone (NARCAN) injection 0.4 mg  0.4 mg Intravenous Q2 Min PRN Pompa, Zack Castaneda MD        Or    naloxone (NARCAN) injection 0.2 mg  0.2 mg Intramuscular Q2 Min PRN Pompa, Zack Castaneda MD        Or    naloxone (NARCAN) injection 0.4 mg  0.4 mg Intramuscular Q2 Min PRN Pompa, Zack Castaneda MD        ondansetron (ZOFRAN ODT) ODT tab 4 mg  4 mg Oral Q6H PRN Pompa, Zack Castaneda MD        Or    ondansetron (ZOFRAN) injection 4 mg  4 mg Intravenous Q6H PRN Pompa, Zack Castaneda MD        oxyCODONE (ROXICODONE) tablet 5 mg  5 mg Oral Q4H PRN  "Zack Pompa MD        oxyCODONE IR (ROXICODONE) half-tab 2.5 mg  2.5 mg Oral Q4H PRN Zack Pompa MD        polyethylene glycol (MIRALAX) Packet 17 g  17 g Oral BID PRN Zack Pompa MD        prochlorperazine (COMPAZINE) injection 10 mg  10 mg Intravenous Q6H PRN Zack Pompa MD        Or    prochlorperazine (COMPAZINE) tablet 10 mg  10 mg Oral Q6H PRN Zack Pompa MD        senna-docusate (SENOKOT-S/PERICOLACE) 8.6-50 MG per tablet 1 tablet  1 tablet Oral BID PRN Zack Pompa MD        Or    senna-docusate (SENOKOT-S/PERICOLACE) 8.6-50 MG per tablet 2 tablet  2 tablet Oral BID PRN Zack Pompa MD        sodium chloride (PF) 0.9% PF flush 3 mL  3 mL Intracatheter q1 min prn Zack Pompa MD        sodium chloride (PF) 0.9% PF flush 3 mL  3 mL Intracatheter Q8H Zack Pompa MD   3 mL at 12/05/24 1320          Allergies   Allergen Reactions    Gluten Meal Diarrhea    Milk (Cow) GI Disturbance    Penicillins Nausea and Vomiting and GI Disturbance     Specifically amoxicillin, reports has been an issue with all penicillins in recent years.    Wheat [Wheat] Unknown         PHYSICAL EXAM    /67 (BP Location: Left arm)   Pulse 71   Temp 97.7  F (36.5  C) (Axillary)   Resp 16   Ht 1.676 m (5' 6\")   Wt 70.8 kg (156 lb)   SpO2 98%   BMI 25.18 kg/m      No acute distress no labored breathing with neck flexion patient has painful discomfort in the back of the hamstrings and glutes.    Alert and oriented to current situations fund of knowledge is intact spontaneous speech and comprehension is normal no dysarthria  Pupils equal and round reacting to light extraocular movements are intact visual fields are full face is symmetric hearing normal to conversation  Able to move all 4 limbs against gravity  Reflexes 2+ upper and lower extremities ; Casual gait no ataxia      Lab and X-ray:   Recent Labs   Lab Test 12/04/24 2018   WBC 6.2   HGB 13.3      POTASSIUM " "4.1     Recent Labs   Lab Test 12/04/24 2018   POTASSIUM 4.1   CHLORIDE 101   BUN 12.2     Recent Labs   Lab Test 12/04/24 2018   WBC 6.2   HGB 13.3   MCV 94        No lab results found.    Invalid input(s): \"TBILI\"  No lab results found.    Invalid input(s): \"CHOLESTEROL\", \"TRIGLYCERIDES\"  No lab results found.    Laboratory results were personally interpreted and reviewed in detail.  Imaging studies reviewed and interpreted in detail      Summary: List Problems:   Patient Active Problem List   Diagnosis    Shoulder pain    Subarachnoid hemorrhage (H)       ASSESSMENT:       New onset/thunderclap headache    CSF findings elevated RBC count and nucleated cells     meningitis and encephalitis panel is negative    Differential consideration of headaches subarachnoid hemorrhage, RCVS    Consider stroke team input . Supportive pain management       Thank you for the opportunity to provide consultation on Keerthi Riddle    "

## 2024-12-05 NOTE — PLAN OF CARE
Date/Time: 12/5/24 1900-2300    **assumed care @ 0300**    Summary: 45 y/o F w/PMH of seizures. Presented to ED on 12/4 w/headache ongoing for one week. Is sometimes accompanied with vomiting or hamstring pain and whooshing in her ears.   Diagnosis: subarachnoid hemorrhage  Orientation: A&Ox4  Behavior & Aggression: green  Activity: ind  Diet: reg  Fall risk: no  Isolation: N/A  Pain: having pain, declines intervention at this time.  B&B: continent, no BM this shift.  VS/O2: VSS, RA.  IV: R PIV, infusing LR bolus.  Drains/Devices: N/A  Tele: N/A  Abnormal Labs: none this shift.  Skin: intact.  Consults/Procedures: neuro consult pending.  D/C Date: TBD  Other: significant other is bedside. Neuros intact.

## 2024-12-05 NOTE — PROGRESS NOTES
"Patient was admitted overnight by my colleague, Dr. Pompa.  This morning she is laying in bed, significant other at her side.  She is feeling better but states that this is most likely because she has been resting and has been receiving medications for \"migraine cocktail\".  She still gets symptomatic Varin when she gets up and stands, or moves or sits up-she gets headache on the top of her head, back of her neck, continues to have stiffness at the back of her thighs up to her knees.  No fevers, chest pain, shortness of breath, abdominal pain, focal weakness or numbness.  No nausea or vomiting or blurred vision.    Reviewed significant findings on CSF analysis-greater than 4000 RBCs, slightly elevated protein level.  Negative meningitis PCR panel on CSF.  CRP less than 3.  Suspicion for bacterial meningitis is very low.  This could possibly still be a viral meningitis,?  Occult SAH given thunderclap headache at onset of symptoms.  Awaiting further evaluation from general neurology.  Patient notes that her headache is somewhat similar to headaches she would get after having had a seizure overnight [she has a history of nocturnal seizures and is on Keppra].  Reviewed MRI/MRA head and neck-no significant findings.  "

## 2024-12-05 NOTE — ED PROVIDER NOTES
"  Emergency Department Note      History of Present Illness     Chief Complaint   Headache      HPI   Keerthi Riddle is a 44 year old female with a history of seizures, who presents with a headache for approximately one week. Patient reports sudden onset of a headache, with initial maximum intensity and vomiting, states it is constant with waxing and waning of severity. Reports her headache began while she was dying her hair and felt a pins and needles sensation. She vomited shortly afterwards. Also reports accompanying glute and hamstring pain and whooshing in her ears. Patient has been taking ibuprofen with moderate relief. She notes only ever having headaches following a seizure and is not had a seizure in some time. Denies blurry vision, light sensitivity, numbness or paresthesias.  She denies fevers.    Independent Historian   None    Review of External Notes   I reviewed office visit from [facility] from yesterday for back and neck pain. I reviewed family medicine visit from yesterday for headache and seizure disorder.     Past Medical History     Medical History and Problem List   Uterine fibroid  IBS  Seizure  S/p LEEP    Medications   levETIRAcetam (KEPPRA) 250 MG tablet  levETIRAcetam (KEPPRA) 500 MG tablet  progesterone (PROMETRIUM) 100 MG capsule  HYDROCODONE-ACETAMINOPHEN  MG OR TABS  VALIUM 5 MG OR TABS  VICODIN 5-500 MG OR TABS      Surgical History   LEEP    Physical Exam     Patient Vitals for the past 24 hrs:   BP Temp Temp src Pulse Resp SpO2 Height Weight   12/05/24 0130 114/79 -- -- 61 10 -- -- --   12/05/24 0100 98/69 -- -- 75 12 97 % -- --   12/04/24 1831 124/84 97  F (36.1  C) Temporal 67 18 100 % 1.676 m (5' 6\") 70.8 kg (156 lb)     Physical Exam  General: Alert, interactive   Head:  Scalp is atraumatic  Eyes:  The pupils are equal, round, and reactive to light    EOM's intact    No scleral icterus  ENT:      Nose:  The external nose is normal  Ears:  External ears are " normal  Mouth/Throat: Mucus membranes are moist       Neck:  Normal range of motion.      There is no rigidity. No meningismus.     Trachea is in the midline       Mild stiffness with flexion of the neck  CV:  Regular rate and rhythm    No murmur   Resp:  Breath sounds are clear bilaterally    Non-labored, no retractions or accessory muscle use      MS:  Normal strength in all 4 extremities  Skin:  Warm and dry, No rash or lesions noted.  Neuro:   Strength 5/5 x4.  Sensation intact  In all 4 extremities.     GCS: 15  Psych: Awake. Alert.  Normal affect.      Appropriate interactions.      Diagnostics     Lab Results   Labs Ordered and Resulted from Time of ED Arrival to Time of ED Departure   BASIC METABOLIC PANEL - Abnormal       Result Value    Sodium 139      Potassium 4.1      Chloride 101      Carbon Dioxide (CO2) 27      Anion Gap 11      Urea Nitrogen 12.2      Creatinine 1.12 (*)     GFR Estimate 61      Calcium 9.7      Glucose 99     PROTEIN TOTAL CSF - Abnormal    Protein total CSF 56.6 (*)    CELL COUNT CSF - Abnormal    Tube Number 4      Color Pink (*)     Clarity Hazy (*)     Total Nucleated Cells 134 (*)     RBC Count 4,000 (*)    GLUCOSE CSF - Normal    Glucose CSF 48     CBC WITH PLATELETS AND DIFFERENTIAL    WBC Count 6.2      RBC Count 4.23      Hemoglobin 13.3      Hematocrit 39.9      MCV 94      MCH 31.4      MCHC 33.3      RDW 11.5      Platelet Count 169      % Neutrophils 64      % Lymphocytes 29      % Monocytes 5      % Eosinophils 1      % Basophils 0      % Immature Granulocytes 0      NRBCs per 100 WBC 0      Absolute Neutrophils 4.0      Absolute Lymphocytes 1.8      Absolute Monocytes 0.3      Absolute Eosinophils 0.0      Absolute Basophils 0.0      Absolute Immature Granulocytes 0.0      Absolute NRBCs 0.0     DIFFERENTIAL CSF    % Neutrophils 8      % Lymphocytes 69      % Monocytes/Macrophages 23     CELL COUNT CSF   DIFFERENTIAL CSF   AEROBIC BACTERIAL CULTURE ROUTINE    Gram  29-Mar-2020 16:10 Stain Result No organisms seen     MENINGITIS/ENCEPHALITIS PANEL QUAL PCR CSF   HERPES SIMPLEX VIRUS 1&2 PCR   CELL COUNT WITH DIFFERENTIAL CSF   CELL COUNT WITH DIFFERENTIAL CSF         Imaging   CT Head w/o Contrast   Final Result   IMPRESSION:    HEAD CT:   1.  No acute intracranial abnormality.      HEAD CTA:    1.  No large vessel occlusion, high-grade stenosis, aneurysm, or high-flow vascular malformation.      NECK CTA:   1.  No hemodynamically significant stenosis or dissection in the neck vessels.   2.  Incidental aberrant right subclavian artery.   3.  Multilevel cervical spondylosis, as described.      CT Head Neck Angio w/o & w Contrast   Final Result   IMPRESSION:    HEAD CT:   1.  No acute intracranial abnormality.      HEAD CTA:    1.  No large vessel occlusion, high-grade stenosis, aneurysm, or high-flow vascular malformation.      NECK CTA:   1.  No hemodynamically significant stenosis or dissection in the neck vessels.   2.  Incidental aberrant right subclavian artery.   3.  Multilevel cervical spondylosis, as described.      MR Brain w/o & w Contrast    (Results Pending)   MRA Angiogram Head w/o Contrast    (Results Pending)   MRA Angiogram Neck w/o & w Contrast    (Results Pending)   MRV Brain wo & w Contrast    (Results Pending)     Independent Interpretation   CT Head: No midline shift.    ED Course      Medications Administered   Medications   sodium chloride 0.9% BOLUS 500 mL (0 mLs Intravenous Stopped 12/4/24 2125)   metoclopramide (REGLAN) injection 10 mg (10 mg Intravenous $Given 12/4/24 2025)   dexAMETHasone PF (DECADRON) injection 10 mg (10 mg Intravenous $Given 12/4/24 2025)   acetaminophen (TYLENOL) tablet 1,000 mg (1,000 mg Oral $Given 12/4/24 2024)   magnesium sulfate 1 g in sodium chloride 0.9 % 100 mL intermittent infusion (0 g Intravenous Stopped 12/4/24 2147)   Saline Flush - CT (90 mLs Intravenous $Given 12/4/24 2042)   iopamidol (ISOVUE-370) solution 67 mL (67 mLs Intravenous  $Given 12/4/24 2643)   LORazepam (ATIVAN) injection 2 mg (2 mg Intravenous $Given 12/5/24 7049)       Procedures   Procedures     Lumbar Puncture      Procedure: Lumbar Puncture      Indication: headache     Consent: Written from Patient  Risks Discussed (including but not limited to): Infection, Bleeding, Spinal headache with possibility of spinal patch, and Temporary or permanent neurological injury     Universal Protocol: Universal protocol was followed and time out conducted just prior to starting procedure, confirming patient identity, site/side, procedure, patient position, and availability of correct equipment and implants.      Anesthesia/Sedation: Lidocaine - 1%     Procedure Note:     Patient was placed in a sitting position.  The skin overlying the L3-4 area was prepped with povidone-iodine.    The patient was medicated as above.   A 22 gauge spinal needle was used to gain access to the subarachnoid space with stylet in place.   Opening pressure was not measured.   The fluid was clear and xanthochromic. See image above  Stylet was replaced and needle withdrawn.      Patient Status:  The patient tolerated the procedure well: Yes. There were no complications.     Discussion of Management   I discussed case with Dr. Cisneros from stroke neurology who is recommended hospitalization, MRI/MRA/MRV  I discussed case with Dr. Pompa from the hospitalist service who is in agreement with admission    ED Course        Additional Documentation  None    Medical Decision Making / Diagnosis     CMS Diagnoses: None    MIPS       None    MDM   Keerthi Riddle is a 44 year old female presenting with 1 week of a headache.  Patient's history was concerning for possible subarachnoid event at the onset of her symptoms.  CT head/CT angio of the head and neck were undertaken and returned is largely unremarkable.  Laboratory workup is reassuring.  Patient had minimal improvement of her symptoms with medications as noted above.   Subsequently had a lengthy discussion about the benefits and risks of lumbar puncture with the patient and this was undertaken, results are noted above with 4000 red cells on tube 4.  I think this likely represents an occult subarachnoid hemorrhage.  She has no infectious signs or symptoms to suggest meningitis encephalitis.  I discussed the case with stroke neurology who has suggested MRI/MRA/MRV and the patient will be admitted to the hospitalist service for further evaluation and treatment.    Critical care time exclusive of procedures is 35 minutes, during this time there is chart review, physician consultation, management of the headache, interpretation of the CSF results.    Disposition   The patient was admitted to the hospital.     Diagnosis     ICD-10-CM    1. Subarachnoid hemorrhage (H)  I60.9            Scribe Disclosure:  I, Katherine Bauer, am serving as a scribe at 8:20 PM on 12/4/2024 to document services personally performed by Ramon Interiano MD based on my observations and the provider's statements to me.        Ramon Interiano MD  12/05/24 0147

## 2024-12-05 NOTE — ED NOTES
[facility]  ED Nurse Handoff Report    ED Chief complaint: Headache      ED Diagnosis:   Final diagnoses:   Subarachnoid hemorrhage (H)       Code Status: for hospitalist to address.    Allergies:   Allergies   Allergen Reactions    Gluten Meal Diarrhea    Milk (Cow) GI Disturbance    Penicillins GI Disturbance and Nausea     With vomiting    Wheat [Wheat] Unknown       Patient Story: Headache since last Tuesday; has been working with her PMD; concerned that she isn't getting the tests she needs. Hx of seizures.  Focused Assessment:  ambulatory, A&Ox4, respirations even and unlabored. Skin dry, warm, color wnl.   Results for orders placed or performed during the hospital encounter of 12/04/24   CT Head w/o Contrast     Status: None    Narrative    EXAM: CT HEAD W/O CONTRAST, CTA HEAD NECK W CONTRAST  LOCATION: [facility]  DATE: 12/4/2024    INDICATION: Headache and neck pain  COMPARISON: None  CONTRAST: 67mL Isovue 370  TECHNIQUE: Head and neck CT angiogram with IV contrast. Noncontrast head CT followed by axial helical CT images of the head and neck vessels obtained during the arterial phase of intravenous contrast administration. Axial 2D reconstructed images and   multiplanar 3D MIP reconstructed images of the head and neck vessels were performed by the technologist. Dose reduction techniques were used. All stenosis measurements made according to NASCET criteria unless otherwise specified.    FINDINGS:   NONCONTRAST HEAD CT:   INTRACRANIAL CONTENTS: Portions of the posterior fossa are obscured by streak artifact. No acute intracranial hemorrhage, extraaxial collection, or mass effect. No evidence of an acute transcortical confluent infarct. Normal parenchymal attenuation.   Normal ventricles and sulci for age.     VISUALIZED ORBITS/SINUSES/MASTOIDS: No acute intraorbital finding. No significant paranasal sinus or mastoid mucosal disease.     BONES/SOFT TISSUES: No acute  abnormality.    HEAD CTA:  ANTERIOR CIRCULATION: No high-grade stenosis, occlusion, aneurysm, or high-flow vascular malformation. A severely hypoplastic P1 segment of the left posterior cerebral artery with a patent left posterior communicating artery.    POSTERIOR CIRCULATION: No high-grade stenosis, occlusion, aneurysm, or high-flow vascular malformation. A hypoplastic right vertebral artery predominantly terminating as the right posterior inferior cerebellar artery.     DURAL VENOUS SINUSES: Expected enhancement of the major dural venous sinuses. Please note that this exam is not specifically tailored for the assessment of the intracranial venous structures.    NECK CTA:  RIGHT CAROTID: No hemodynamically significant stenosis or dissection.    LEFT CAROTID: No hemodynamically significant stenosis or dissection.    VERTEBRAL ARTERIES: No focal high-grade stenosis or dissection. Balanced vertebral arteries.    AORTIC ARCH: Incidentally noted aberrant right subclavian artery without aneurysmal dilatation at its origin. A common origin of both common carotid arteries. Patent visualized thoracic aorta, aortic arch, and proximal great vessels without significant   atherosclerotic disease or stenosis.    NONVASCULAR STRUCTURES: Straightening of the normal cervical lordosis with 2-3 mm likely degenerative anterolisthesis at C6-C7 in the setting of mild-moderate left C5-C7 facet arthrosis. Multilevel interbody degenerative change including mild-moderate   intervertebral disc height loss at C4-C5 and C6-C7, mild at C5-C6. While assessment is limited due to streak artifact, there is likely mild spinal canal stenosis at C5-C6. Moderate left C5-C7 foraminal narrowing, mild-moderate at C4-C5 on the left. Clear   visualized lungs.      Impression    IMPRESSION:   HEAD CT:  1.  No acute intracranial abnormality.    HEAD CTA:   1.  No large vessel occlusion, high-grade stenosis, aneurysm, or high-flow vascular  malformation.    NECK CTA:  1.  No hemodynamically significant stenosis or dissection in the neck vessels.  2.  Incidental aberrant right subclavian artery.  3.  Multilevel cervical spondylosis, as described.   CT Head Neck Angio w/o & w Contrast     Status: None    Narrative    EXAM: CT HEAD W/O CONTRAST, CTA HEAD NECK W CONTRAST  LOCATION: [facility]  DATE: 12/4/2024    INDICATION: Headache and neck pain  COMPARISON: None  CONTRAST: 67mL Isovue 370  TECHNIQUE: Head and neck CT angiogram with IV contrast. Noncontrast head CT followed by axial helical CT images of the head and neck vessels obtained during the arterial phase of intravenous contrast administration. Axial 2D reconstructed images and   multiplanar 3D MIP reconstructed images of the head and neck vessels were performed by the technologist. Dose reduction techniques were used. All stenosis measurements made according to NASCET criteria unless otherwise specified.    FINDINGS:   NONCONTRAST HEAD CT:   INTRACRANIAL CONTENTS: Portions of the posterior fossa are obscured by streak artifact. No acute intracranial hemorrhage, extraaxial collection, or mass effect. No evidence of an acute transcortical confluent infarct. Normal parenchymal attenuation.   Normal ventricles and sulci for age.     VISUALIZED ORBITS/SINUSES/MASTOIDS: No acute intraorbital finding. No significant paranasal sinus or mastoid mucosal disease.     BONES/SOFT TISSUES: No acute abnormality.    HEAD CTA:  ANTERIOR CIRCULATION: No high-grade stenosis, occlusion, aneurysm, or high-flow vascular malformation. A severely hypoplastic P1 segment of the left posterior cerebral artery with a patent left posterior communicating artery.    POSTERIOR CIRCULATION: No high-grade stenosis, occlusion, aneurysm, or high-flow vascular malformation. A hypoplastic right vertebral artery predominantly terminating as the right posterior inferior cerebellar artery.     DURAL VENOUS  SINUSES: Expected enhancement of the major dural venous sinuses. Please note that this exam is not specifically tailored for the assessment of the intracranial venous structures.    NECK CTA:  RIGHT CAROTID: No hemodynamically significant stenosis or dissection.    LEFT CAROTID: No hemodynamically significant stenosis or dissection.    VERTEBRAL ARTERIES: No focal high-grade stenosis or dissection. Balanced vertebral arteries.    AORTIC ARCH: Incidentally noted aberrant right subclavian artery without aneurysmal dilatation at its origin. A common origin of both common carotid arteries. Patent visualized thoracic aorta, aortic arch, and proximal great vessels without significant   atherosclerotic disease or stenosis.    NONVASCULAR STRUCTURES: Straightening of the normal cervical lordosis with 2-3 mm likely degenerative anterolisthesis at C6-C7 in the setting of mild-moderate left C5-C7 facet arthrosis. Multilevel interbody degenerative change including mild-moderate   intervertebral disc height loss at C4-C5 and C6-C7, mild at C5-C6. While assessment is limited due to streak artifact, there is likely mild spinal canal stenosis at C5-C6. Moderate left C5-C7 foraminal narrowing, mild-moderate at C4-C5 on the left. Clear   visualized lungs.      Impression    IMPRESSION:   HEAD CT:  1.  No acute intracranial abnormality.    HEAD CTA:   1.  No large vessel occlusion, high-grade stenosis, aneurysm, or high-flow vascular malformation.    NECK CTA:  1.  No hemodynamically significant stenosis or dissection in the neck vessels.  2.  Incidental aberrant right subclavian artery.  3.  Multilevel cervical spondylosis, as described.   Basic metabolic panel     Status: Abnormal   Result Value Ref Range    Sodium 139 135 - 145 mmol/L    Potassium 4.1 3.4 - 5.3 mmol/L    Chloride 101 98 - 107 mmol/L    Carbon Dioxide (CO2) 27 22 - 29 mmol/L    Anion Gap 11 7 - 15 mmol/L    Urea Nitrogen 12.2 6.0 - 20.0 mg/dL    Creatinine 1.12 (H)  0.51 - 0.95 mg/dL    GFR Estimate 61 >60 mL/min/1.73m2    Calcium 9.7 8.8 - 10.4 mg/dL    Glucose 99 70 - 99 mg/dL   CBC with platelets and differential     Status: None   Result Value Ref Range    WBC Count 6.2 4.0 - 11.0 10e3/uL    RBC Count 4.23 3.80 - 5.20 10e6/uL    Hemoglobin 13.3 11.7 - 15.7 g/dL    Hematocrit 39.9 35.0 - 47.0 %    MCV 94 78 - 100 fL    MCH 31.4 26.5 - 33.0 pg    MCHC 33.3 31.5 - 36.5 g/dL    RDW 11.5 10.0 - 15.0 %    Platelet Count 169 150 - 450 10e3/uL    % Neutrophils 64 %    % Lymphocytes 29 %    % Monocytes 5 %    % Eosinophils 1 %    % Basophils 0 %    % Immature Granulocytes 0 %    NRBCs per 100 WBC 0 <1 /100    Absolute Neutrophils 4.0 1.6 - 8.3 10e3/uL    Absolute Lymphocytes 1.8 0.8 - 5.3 10e3/uL    Absolute Monocytes 0.3 0.0 - 1.3 10e3/uL    Absolute Eosinophils 0.0 0.0 - 0.7 10e3/uL    Absolute Basophils 0.0 0.0 - 0.2 10e3/uL    Absolute Immature Granulocytes 0.0 <=0.4 10e3/uL    Absolute NRBCs 0.0 10e3/uL   Glucose CSF:     Status: Normal   Result Value Ref Range    Glucose CSF 48 40 - 70 mg/dL    Narrative    CSF glucose concentrations are about 60 percent of normal plasma glucose.   Protein total CSF:     Status: Abnormal   Result Value Ref Range    Protein total CSF 56.6 (H) 15.0 - 45.0 mg/dL   Cell Count CSF     Status: Abnormal   Result Value Ref Range    Tube Number 4     Color Pink (A) Colorless    Clarity Hazy (A) Clear    Total Nucleated Cells 134 (H) 0 - 5 /uL    RBC Count 4,000 (H) 0 - 2 /uL   Differential CSF     Status: None   Result Value Ref Range    % Neutrophils 8 %    % Lymphocytes 69 %    % Monocytes/Macrophages 23 %    Narrative    No reference ranges have been established. This result should be interpreted in the context of the patient's clinical condition and compared to simultaneous measurement in the patient's blood.   CBC with platelets differential     Status: None    Narrative    The following orders were created for panel order CBC with platelets  differential.  Procedure                               Abnormality         Status                     ---------                               -----------         ------                     CBC with platelets and d...[[id number]]                      Final result                 Please view results for these tests on the individual orders.   CSF Cell Count with Differential:     Status: Abnormal    Narrative    The following orders were created for panel order CSF Cell Count with Differential:.  Procedure                               Abnormality         Status                     ---------                               -----------         ------                     Cell Count CSF[[id number]]               Abnormal            Final result               Differential CSF[[id number]]                                 Final result                 Please view results for these tests on the individual orders.       Treatments and/or interventions provided:   Medications   sodium chloride 0.9% BOLUS 500 mL (0 mLs Intravenous Stopped 12/4/24 2125)   metoclopramide (REGLAN) injection 10 mg (10 mg Intravenous $Given 12/4/24 2025)   dexAMETHasone PF (DECADRON) injection 10 mg (10 mg Intravenous $Given 12/4/24 2025)   acetaminophen (TYLENOL) tablet 1,000 mg (1,000 mg Oral $Given 12/4/24 2024)   magnesium sulfate 1 g in sodium chloride 0.9 % 100 mL intermittent infusion (0 g Intravenous Stopped 12/4/24 2147)   Saline Flush - CT (90 mLs Intravenous $Given 12/4/24 2042)   iopamidol (ISOVUE-370) solution 67 mL (67 mLs Intravenous $Given 12/4/24 2048)     Patient's response to treatments and/or interventions: VSS, pain controlled, comfortably resting in stretcher.    To be done/followed up on inpatient unit:  continue with POC    Does this patient have any cognitive concerns?:  n/a    Activity level - Baseline/Home:  Independent  Activity Level - Current:   Independent    Patient's Preferred language: English   Needed?:  "No    Isolation: None  Infection: Not Applicable  Patient tested for COVID 19 prior to admission: NO  Bariatric?: No    Vital Signs:   Vitals:    12/04/24 1831 12/05/24 0100   BP: 124/84 98/69   Pulse: 67 75   Resp: 18 12   Temp: 97  F (36.1  C)    TempSrc: Temporal    SpO2: 100% 97%   Weight: 70.8 kg (156 lb)    Height: 1.676 m (5' 6\")        Cardiac Rhythm:     Was the PSS-3 completed:   Yes  Family Comments: SO at bedside  OBS brochure/video discussed/provided to patient/family: N/A  For the majority of the shift this patient's behavior was Green.   Behavioral interventions performed were n/a.    ED NURSE PHONE NUMBER: [phone]       "

## 2024-12-05 NOTE — PROGRESS NOTES
RECEIVING UNIT ED HANDOFF REVIEW    ED Nurse Handoff Report was reviewed by: Tiffany Willis RN on December 5, 2024 at 2:45 AM

## 2024-12-05 NOTE — CONSULTS
"  [facility]    Stroke Telephone Note    I was called by Ramon Interiano on 12/05/24 regarding patient Keerthi Riddle. The patient is a 44 year old female with no significant vascular risk factors who complains headache since 1 week ago. This headache was reported  as new for her, high intensity  at the beginning but  variable intensity during the last days.  Patient had an episode of vomiting right after headache onset. No focal neurological deficits reported. CT showed no obvious bleeding but CSF showed xanthochromia and elevated RBC and proteins as bellow.         Tube Number 4    Color  Colorless Pink Abnormal     Clarity  Clear Hazy Abnormal     Total Nucleated Cells  0 - 5 /uL 134 High     RBC Count  0 - 2 /uL 4,000 High          Vitals  BP: 98/69   Pulse: 75   Resp: 12   Temp: 97  F (36.1  C)   Weight: 70.8 kg (156 lb)    Imaging Findings  CT head: No clear Bleeding  CTA head/neck: No LVO    Impression  Possible SAH: New headache since 1 week ago. Xanthochromia and high RBC noted on CSF study. Differential usually includes RCVS, cortical venous thrombosis or use of drugs or trauma. CTA with no RCVS findings.      Recommendations  Admit   MRI/MRA/ MRV  UDS  No antiplatelets  SBP cap 150 mmhg  Complete CSF labs including cultures  Will follow     My recommendations are based on the information provided over the phone by Keerthi Riddle's in-person providers. They are not intended to replace the clinical judgment of her in-person providers. I was not requested to personally see or examine the patient at this time.     Joe Cisneros MD  Vascular Neurology    To page me or covering stroke neurology team member, click here: AMCOM  Choose \"On Call\" tab at top, then select \"NEUROLOGY/ALL SITES\" from middle drop-down box, press Enter, then look for \"stroke\" or \"telestroke\" for your site.   "

## 2024-12-05 NOTE — ED TRIAGE NOTES
Headache since last Tuesday; has been working with her PMD; concerned that she isn't getting the tests she needs

## 2024-12-06 ENCOUNTER — APPOINTMENT (OUTPATIENT)
Dept: INTERVENTIONAL RADIOLOGY/VASCULAR | Facility: CLINIC | Age: 44
End: 2024-12-06
Attending: STUDENT IN AN ORGANIZED HEALTH CARE EDUCATION/TRAINING PROGRAM
Payer: COMMERCIAL

## 2024-12-06 LAB
GLUCOSE BLDC GLUCOMTR-MCNC: 81 MG/DL (ref 70–99)
HCG UR QL: NEGATIVE

## 2024-12-06 PROCEDURE — 250N000011 HC RX IP 250 OP 636: Performed by: STUDENT IN AN ORGANIZED HEALTH CARE EDUCATION/TRAINING PROGRAM

## 2024-12-06 PROCEDURE — 120N000001 HC R&B MED SURG/OB

## 2024-12-06 PROCEDURE — 76937 US GUIDE VASCULAR ACCESS: CPT | Mod: 26 | Performed by: NEUROLOGICAL SURGERY

## 2024-12-06 PROCEDURE — 258N000003 HC RX IP 258 OP 636: Performed by: STUDENT IN AN ORGANIZED HEALTH CARE EDUCATION/TRAINING PROGRAM

## 2024-12-06 PROCEDURE — 36226 PLACE CATH VERTEBRAL ART: CPT

## 2024-12-06 PROCEDURE — 250N000013 HC RX MED GY IP 250 OP 250 PS 637: Performed by: INTERNAL MEDICINE

## 2024-12-06 PROCEDURE — 36223 PLACE CATH CAROTID/INOM ART: CPT | Mod: 50 | Performed by: NEUROLOGICAL SURGERY

## 2024-12-06 PROCEDURE — B3121ZZ FLUOROSCOPY OF LEFT SUBCLAVIAN ARTERY USING LOW OSMOLAR CONTRAST: ICD-10-PCS | Performed by: NEUROLOGICAL SURGERY

## 2024-12-06 PROCEDURE — B3151ZZ FLUOROSCOPY OF BILATERAL COMMON CAROTID ARTERIES USING LOW OSMOLAR CONTRAST: ICD-10-PCS | Performed by: NEUROLOGICAL SURGERY

## 2024-12-06 PROCEDURE — 36225 PLACE CATH SUBCLAVIAN ART: CPT | Mod: XS | Performed by: NEUROLOGICAL SURGERY

## 2024-12-06 PROCEDURE — B31D1ZZ FLUOROSCOPY OF RIGHT VERTEBRAL ARTERY USING LOW OSMOLAR CONTRAST: ICD-10-PCS | Performed by: NEUROLOGICAL SURGERY

## 2024-12-06 PROCEDURE — 36226 PLACE CATH VERTEBRAL ART: CPT | Mod: RT | Performed by: NEUROLOGICAL SURGERY

## 2024-12-06 PROCEDURE — 250N000009 HC RX 250: Performed by: STUDENT IN AN ORGANIZED HEALTH CARE EDUCATION/TRAINING PROGRAM

## 2024-12-06 PROCEDURE — 272N000192 HC ACCESSORY CR2

## 2024-12-06 PROCEDURE — C1769 GUIDE WIRE: HCPCS

## 2024-12-06 PROCEDURE — 250N000013 HC RX MED GY IP 250 OP 250 PS 637: Performed by: STUDENT IN AN ORGANIZED HEALTH CARE EDUCATION/TRAINING PROGRAM

## 2024-12-06 PROCEDURE — C1887 CATHETER, GUIDING: HCPCS

## 2024-12-06 PROCEDURE — 272N000280 HC DEVICE COMPRESSION CR5

## 2024-12-06 PROCEDURE — C1760 CLOSURE DEV, VASC: HCPCS

## 2024-12-06 PROCEDURE — 272N000570 HC SHEATH CR7

## 2024-12-06 PROCEDURE — 99152 MOD SED SAME PHYS/QHP 5/>YRS: CPT

## 2024-12-06 PROCEDURE — 255N000002 HC RX 255 OP 636: Performed by: NEUROLOGICAL SURGERY

## 2024-12-06 PROCEDURE — 272N000567 HC SHEATH CR4

## 2024-12-06 PROCEDURE — 99152 MOD SED SAME PHYS/QHP 5/>YRS: CPT | Mod: GC | Performed by: NEUROLOGICAL SURGERY

## 2024-12-06 PROCEDURE — 99253 IP/OBS CNSLTJ NEW/EST LOW 45: CPT | Mod: 25 | Performed by: STUDENT IN AN ORGANIZED HEALTH CARE EDUCATION/TRAINING PROGRAM

## 2024-12-06 PROCEDURE — 99233 SBSQ HOSP IP/OBS HIGH 50: CPT | Performed by: HOSPITALIST

## 2024-12-06 RX ORDER — HEPARIN SODIUM 1000 [USP'U]/ML
1000 INJECTION, SOLUTION INTRAVENOUS; SUBCUTANEOUS ONCE
Status: COMPLETED | OUTPATIENT
Start: 2024-12-06 | End: 2024-12-06

## 2024-12-06 RX ORDER — HEPARIN SODIUM 200 [USP'U]/100ML
1 INJECTION, SOLUTION INTRAVENOUS EVERY 5 MIN PRN
Status: DISCONTINUED | OUTPATIENT
Start: 2024-12-06 | End: 2024-12-08 | Stop reason: HOSPADM

## 2024-12-06 RX ORDER — NALOXONE HYDROCHLORIDE 0.4 MG/ML
0.4 INJECTION, SOLUTION INTRAMUSCULAR; INTRAVENOUS; SUBCUTANEOUS
Status: DISCONTINUED | OUTPATIENT
Start: 2024-12-06 | End: 2024-12-06 | Stop reason: HOSPADM

## 2024-12-06 RX ORDER — KETOROLAC TROMETHAMINE 30 MG/ML
30 INJECTION, SOLUTION INTRAMUSCULAR; INTRAVENOUS EVERY 6 HOURS PRN
Status: DISCONTINUED | OUTPATIENT
Start: 2024-12-06 | End: 2024-12-08 | Stop reason: HOSPADM

## 2024-12-06 RX ORDER — MAGNESIUM SULFATE HEPTAHYDRATE 40 MG/ML
2 INJECTION, SOLUTION INTRAVENOUS ONCE
Status: COMPLETED | OUTPATIENT
Start: 2024-12-06 | End: 2024-12-06

## 2024-12-06 RX ORDER — HEPARIN SODIUM 200 [USP'U]/100ML
1 INJECTION, SOLUTION INTRAVENOUS EVERY 5 MIN PRN
OUTPATIENT
Start: 2024-12-06

## 2024-12-06 RX ORDER — IBUPROFEN 200 MG
400 TABLET ORAL ONCE
Status: COMPLETED | OUTPATIENT
Start: 2024-12-06 | End: 2024-12-06

## 2024-12-06 RX ORDER — IOPAMIDOL 612 MG/ML
100 INJECTION, SOLUTION INTRAVASCULAR ONCE
Status: COMPLETED | OUTPATIENT
Start: 2024-12-06 | End: 2024-12-06

## 2024-12-06 RX ORDER — FENTANYL CITRATE 50 UG/ML
25-50 INJECTION, SOLUTION INTRAMUSCULAR; INTRAVENOUS EVERY 5 MIN PRN
Status: DISCONTINUED | OUTPATIENT
Start: 2024-12-06 | End: 2024-12-06 | Stop reason: HOSPADM

## 2024-12-06 RX ORDER — ONDANSETRON 2 MG/ML
4 INJECTION INTRAMUSCULAR; INTRAVENOUS ONCE
Status: COMPLETED | OUTPATIENT
Start: 2024-12-06 | End: 2024-12-06

## 2024-12-06 RX ORDER — FLUMAZENIL 0.1 MG/ML
0.2 INJECTION, SOLUTION INTRAVENOUS
Status: DISCONTINUED | OUTPATIENT
Start: 2024-12-06 | End: 2024-12-06 | Stop reason: HOSPADM

## 2024-12-06 RX ORDER — LIDOCAINE 40 MG/G
CREAM TOPICAL
OUTPATIENT
Start: 2024-12-06

## 2024-12-06 RX ORDER — NALOXONE HYDROCHLORIDE 0.4 MG/ML
0.2 INJECTION, SOLUTION INTRAMUSCULAR; INTRAVENOUS; SUBCUTANEOUS
Status: DISCONTINUED | OUTPATIENT
Start: 2024-12-06 | End: 2024-12-06 | Stop reason: HOSPADM

## 2024-12-06 RX ORDER — SODIUM CHLORIDE, SODIUM LACTATE, POTASSIUM CHLORIDE, CALCIUM CHLORIDE 600; 310; 30; 20 MG/100ML; MG/100ML; MG/100ML; MG/100ML
INJECTION, SOLUTION INTRAVENOUS CONTINUOUS
Status: ACTIVE | OUTPATIENT
Start: 2024-12-06 | End: 2024-12-06

## 2024-12-06 RX ORDER — DIPHENHYDRAMINE HYDROCHLORIDE 50 MG/ML
25 INJECTION INTRAMUSCULAR; INTRAVENOUS EVERY 6 HOURS PRN
Status: DISCONTINUED | OUTPATIENT
Start: 2024-12-06 | End: 2024-12-08 | Stop reason: HOSPADM

## 2024-12-06 RX ADMIN — FENTANYL CITRATE 50 MCG: 50 INJECTION, SOLUTION INTRAMUSCULAR; INTRAVENOUS at 17:43

## 2024-12-06 RX ADMIN — HEPARIN SODIUM 4 BAG: 200 INJECTION, SOLUTION INTRAVENOUS at 17:48

## 2024-12-06 RX ADMIN — HEPARIN SODIUM 1000 UNITS: 1000 INJECTION INTRAVENOUS; SUBCUTANEOUS at 17:34

## 2024-12-06 RX ADMIN — LIDOCAINE HYDROCHLORIDE 12 ML: 10 INJECTION, SOLUTION INFILTRATION; PERINEURAL at 16:38

## 2024-12-06 RX ADMIN — LEVETIRACETAM 500 MG: 500 TABLET, FILM COATED ORAL at 22:08

## 2024-12-06 RX ADMIN — IOPAMIDOL 150 ML: 612 INJECTION, SOLUTION INTRAVENOUS at 17:57

## 2024-12-06 RX ADMIN — LEVETIRACETAM 250 MG: 250 TABLET, FILM COATED ORAL at 08:10

## 2024-12-06 RX ADMIN — ONDANSETRON 4 MG: 2 INJECTION, SOLUTION INTRAMUSCULAR; INTRAVENOUS at 19:48

## 2024-12-06 RX ADMIN — MIDAZOLAM 2 MG: 1 INJECTION INTRAMUSCULAR; INTRAVENOUS at 17:38

## 2024-12-06 RX ADMIN — FENTANYL CITRATE 50 MCG: 50 INJECTION, SOLUTION INTRAMUSCULAR; INTRAVENOUS at 16:55

## 2024-12-06 RX ADMIN — PROGESTERONE 100 MG: 100 CAPSULE ORAL at 22:08

## 2024-12-06 RX ADMIN — HEPARIN SODIUM: 1000 INJECTION INTRAVENOUS; SUBCUTANEOUS at 16:42

## 2024-12-06 RX ADMIN — SODIUM CHLORIDE 500 ML: 9 INJECTION, SOLUTION INTRAVENOUS at 23:37

## 2024-12-06 RX ADMIN — SODIUM CHLORIDE 500 ML: 9 INJECTION, SOLUTION INTRAVENOUS at 19:48

## 2024-12-06 RX ADMIN — IBUPROFEN 400 MG: 200 TABLET, FILM COATED ORAL at 23:28

## 2024-12-06 RX ADMIN — MIDAZOLAM 2 MG: 1 INJECTION INTRAMUSCULAR; INTRAVENOUS at 16:33

## 2024-12-06 RX ADMIN — MAGNESIUM SULFATE HEPTAHYDRATE 2 G: 40 INJECTION, SOLUTION INTRAVENOUS at 21:08

## 2024-12-06 RX ADMIN — ACETAMINOPHEN 650 MG: 325 TABLET, FILM COATED ORAL at 11:02

## 2024-12-06 RX ADMIN — ACETAMINOPHEN 650 MG: 325 TABLET, FILM COATED ORAL at 19:44

## 2024-12-06 RX ADMIN — FENTANYL CITRATE 50 MCG: 50 INJECTION, SOLUTION INTRAMUSCULAR; INTRAVENOUS at 16:47

## 2024-12-06 ASSESSMENT — ACTIVITIES OF DAILY LIVING (ADL)
ADLS_ACUITY_SCORE: 18
ADLS_ACUITY_SCORE: 15
ADLS_ACUITY_SCORE: 16
ADLS_ACUITY_SCORE: 15

## 2024-12-06 NOTE — PROGRESS NOTES
[facility]     Endovascular Surgical Neuroradiology Pre-Procedure Note      HPI:  Keerthi Riddle is a 44 year old female with Seizures, who had acute onset of bifrontal temporal retro-orbital thunderclap headache with vomiting. She bilateral whooshing sound in her ears and has neck pain with flexion. She was medicating with over the counter medications but came in because symptoms persisted. Headache onset was 11/26. Her head Ct did not show hemorrhage and her CTA was negative for aneurysm. She had a lumbar puncture that showed subarachnoid hemorrhage and she presents now for diagnostic cerebral angiogram.Patient noted to have bilateral temporal blurry vision and new onset of decreased facial sensation.     Medical History:  History reviewed. No pertinent past medical history.    Surgical History:  History reviewed. No pertinent surgical history.    Family History:  No family history on file.    Social History:  Social History     Socioeconomic History    Marital status: Single     Spouse name: Not on file    Number of children: Not on file    Years of education: Not on file    Highest education level: Not on file   Occupational History    Not on file   Tobacco Use    Smoking status: Not on file    Smokeless tobacco: Not on file   Substance and Sexual Activity    Alcohol use: Not on file    Drug use: Not on file    Sexual activity: Not on file   Other Topics Concern    Not on file   Social History Narrative    Not on file     Social Drivers of Health     Financial Resource Strain: Low Risk  (12/5/2024)    Financial Resource Strain     Within the past 12 months, have you or your family members you live with been unable to get utilities (heat, electricity) when it was really needed?: No   Food Insecurity: Low Risk  (12/5/2024)    Food Insecurity     Within the past 12 months, did you worry that your food would run out before you got money to buy more?: No     Within the past 12 months,  did the food you bought just not last and you didn t have money to get more?: No   Transportation Needs: Low Risk  (12/5/2024)    Transportation Needs     Within the past 12 months, has lack of transportation kept you from medical appointments, getting your medicines, non-medical meetings or appointments, work, or from getting things that you need?: No   Physical Activity: Not on File (8/26/2019)    Received from [email or web address]    Physical Activity     Physical Activity: 0   Stress: Not on File (8/26/2019)    Received from [email or web address]    Stress     Stress: 0   Social Connections: Not on File (8/26/2019)    Received from [email or web address]    Social Connections     Social Connections and Isolation: 0   Interpersonal Safety: Not on file   Housing Stability: Low Risk  (12/5/2024)    Housing Stability     Do you have housing? : Yes     Are you worried about losing your housing?: No       Allergies:  Allergies   Allergen Reactions    Gluten Meal Diarrhea    Milk (Cow) GI Disturbance    Penicillins Nausea and Vomiting and GI Disturbance     Specifically amoxicillin, reports has been an issue with all penicillins in recent years.    Wheat [Wheat] Unknown       Is there a contrast allergy?  No    Medications:  Medications Prior to Admission   Medication Sig Dispense Refill Last Dose/Taking    5-HTP CAPS Take 1 Dose by mouth at bedtime.   Taking    acetaminophen (TYLENOL) 325 MG tablet Take 650 mg by mouth every 6 hours as needed for mild pain.   Taking As Needed    Acetylcysteine (NAC PO) Take 1 Dose by mouth at bedtime.   Taking    Biotin 5000 MCG TABS Take 5,000 mcg by mouth three times a week. Takes a few times a week as remembers (does not take daily given high dose).   Past Week    cholecalciferol (VITAMIN D3) 125 mcg (5000 units) capsule Take 125 mcg by mouth daily.   Taking    Coenzyme Q10 (CO Q-10 PO) Take 1 Dose by mouth daily.   Taking    CRANBERRY EXTRACT PO Take 1 Dose by mouth daily.   Taking    FOLIC ACID PO Take 1 Dose by mouth daily.    Taking    ibuprofen (ADVIL/MOTRIN) 200 MG tablet Take 600 mg by mouth every 4 hours as needed for pain.   Taking As Needed    levETIRAcetam (KEPPRA) 250 MG tablet Take 250 mg by mouth every morning.   12/4/2024 Morning    levETIRAcetam (KEPPRA) 500 MG tablet Take 500 mg by mouth every evening.   12/4/2024 Evening    melatonin 1 MG TABS tablet Take 1 mg by mouth at bedtime.   Taking    MILK THISTLE PO Take 2 capsules by mouth daily.   Taking    Omega-3 Fatty Acids (FISH OIL PO) Take 1 capsule by mouth daily.  Ameya's Supplement   Taking    Prasterone, DHEA, (DHEA PO) Take 1 Dose by mouth daily. Pure Encapsulations Brand   Taking    progesterone (PROMETRIUM) 100 MG capsule Take 100 mg by mouth at bedtime.   12/4/2024 Evening    THEANINE PO Take 1 Dose by mouth daily.   Taking    TURMERIC PO Take 600 mg by mouth daily.   Taking    UNABLE TO FIND Take 2 tablets by mouth daily. MEDICATION NAME: Bone-Up Supplement   Taking    UNABLE TO FIND Take 1 Dose by mouth daily. MEDICATION NAME: Pure Encapsulations DopaPlus   Taking    UNABLE TO FIND Take 5 mg by mouth daily. MEDICATION NAME: Pure Encapsulations Lithium (orotate)   Taking    vitamin C (ASCORBIC ACID) 1000 MG TABS Take 1,000 mg by mouth daily.   Taking    UNABLE TO FIND Take 1 Dose by mouth daily. MEDICATION NAME: Lion's Usman Supplement (Patient not taking: Reported on 12/5/2024)   Not Taking    UNABLE TO FIND Take 1 Dose by mouth daily. MEDICATION NAME: Cordyseps Mushrooms (Patient not taking: Reported on 12/5/2024)   Not Taking    UNABLE TO FIND Take 1 capsule by mouth at bedtime. MEDICATION NAME: CBD + Melatonin 3 mg  --> Uses instead of melatonin 1 mg when available to purchase.      .    ROS:  The 5 point Review of Systems is negative other than noted in the HPI or here.     PHYSICAL EXAMINATION  Vital Signs:  B/P: 105/69,  T: 97.7,  P: 60,  R: 16    Cardio:  RRR  Pulmonary:  no respiratory distress  Abdomen:  non-distended    Neurologic  Mental Status:   fully alert, attentive and oriented, follows commands, speech clear and fluent  Cranial Nerves:  EOMI with normal smooth pursuit,  facial movements symmetric, hearing not formally tested but intact to conversation, palate elevation symmetric and uvula midline, no dysarthria, shoulder shrug strong bilaterally, tongue protrusion midline, bilateral temporal blurry vision, decreased sensation on the left face  Motor:  no abnormal movements, normal tone throughout, normal muscle bulk, no pronator drift, strength 5/5 throughout upper and lower extremities  Sensory:   intact to light touch bilaterally   Coordination:  FNF and HS intact without dysmetria    Pre-procedure National Institutes of Health Stroke Scale:   Not applicable    LABS  (most recent Cr, BUN, GFR, PLT, INR, PTT within the past 7 days):  Recent Labs   Lab 12/04/24 2018   CR 1.12*   BUN 12.2   GFRESTIMATED 61           Platelet Function P2Y12 (PRU):  Not applicable      ASSESSMENT: 44F with sudden onset of headache, xanthrochromia, elevated RBC in her lumbar puncture who presents for diagnostic cerebral angiogram to assess for aneurysmal source of suspected subarachnoid hemorrhage.     PLAN:  Diagnostic cerebral angiogram     PRE-PROCEDURE SEDATION ASSESSMENT     Pre-Procedure Sedation Assessment done at 1530.    Expected Level:  Minimal Sedation    Indication:  Sedation is required to allow for neurointerventional procedure.    Consent obtained from patient after discussing the risks, benefits and alternatives.     PO Intake:  Appropriately NPO for procedure    ASA Class:  Class 1 - HEALTHY PATIENT    Mallampati:  Grade 2:  Soft palate, base of uvula, tonsillar pillars, and portion of posterior pharyngeal wall visible    History and physical reviewed and no updates needed. I have reviewed the lab findings, diagnostic data, medications, and the plan for sedation. I have determined this patient to be an appropriate candidate for the planned sedation  and procedure and have reassessed the patient IMMEDIATELY PRIOR to sedation and procedure.    Patient was discussed with the Attending, Dr. Ngo  , who agrees with the plan.  Ara Goodman MD  Endovascular Surgical Neuroradiology Fellow  [facility]  [phone]    Endovascular Surgical Neuroradiology staff is Dr. Ngo

## 2024-12-06 NOTE — PROVIDER NOTIFICATION
MD Notification    Notified Person: MD    Notified Person Name: Shahrzad DIOR, vascular neurology     Notification Date/Time: December 6, 2024 at 1548    Notification Interaction: vocera message     Purpose of Notification: Pt reporting blurry peripheral vision, R face is a little bit more sensitive to touch with evening assessment. Is Pt ok to go down for procedure? please advise.    Orders Received: Provider aware, reports Pt is ok to go down for procedure. To re-evaluate Pt after the angiogram.     Comments:    Notification Date/Time: December 6, 2024 at 1900    Purpose of Notification: Pt back from procedure, noted her R drosi/plantar is moderate due to pain. otherwise continues to have blurry peripheral vision.    Orders Received:  Per Provider will follow results for procedure and order a CT head for tonight.

## 2024-12-06 NOTE — PLAN OF CARE
Goal Outcome Evaluation:                      Pt here with SAH after going to ED for headache w/ vomiting and pain in hamstrings, hx of seizures and takes Keppra. A&O x4. Neuros intact. VSS. Reg diet, thin liquids. Takes pills whole. Up independently - refuses SBA. Wishes to keep pain below 7, reports not wanting any pain medication for pain level below that this shift - PRN tylenol given x1 for increasing headache before bed. Pt refused vitals and neuro checks overnight. Pt scoring green on the Aggression Stop Light Tool. Discharge plan pending.
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
## 2024-12-06 NOTE — PROGRESS NOTES
[facility]    Hospitalist Progress Note    Date of Admission:  12/4/2024    Assessment & Plan       Keerthi Riddel is a 44 year old female with a history of seizures admitted on 12/4/2024 with over 1 week of headache associated initially with acute onset and intense pain with nausea and emesis, whooshing tinnitus intermittently, neck stiffness which has been improving, some sensation of tightness in her hamstrings when standing without clear sciatica, positional worsening of headache and lower extremity symptoms when standing.  Found to have xanthochromic CSF.  Multiple imaging studies negative for acute pathology.     Headache with improving meningismus: Thunderclap headache, possible occult ICH.  Sudden onset thunderclap headache on November 26.  History of nocturnal seizures, but no recent seizures reported and has been adherent to her Keppra prescriptions.  No fevers.  Symptoms worsen if she is sitting or standing for a prolonged period of time.  Xanthochromic lumbar puncture.  Gram stain negative.  CSF findings with elevated RBC count, nucleated cells, mildly elevated protein.  UA unrevealing, normal CRP    -Systolic blood pressure goal less than 150.  Has been normotensive  -Meningitis panel PCR completely negative  -General Neurology consulted: Recommended stroke team input and supportive pain management.  Differential still includes SAH, RCVS.  -Stroke neurology consulted.  Recommended diagnostic cerebral angiogram on 12/6.  -1 L LR bolus given on 12/5: Patient reports that she felt better after this and per her request, we will repeat 500 cc over 5 hours on 12/6.  -Acetaminophen, oxycodone, IV Dilaudid available as needed for pain control  -IV hydralazine available if needed for systolic blood pressure greater than 150.     Seizure disorder: Has had witnessed tonic-clonic events in 2019 with posturing and postictal state consistent with seizure.  Normal EEG at that time.  Has  "also had some subsequent parasomnia versus petit mal seizures.  Follows with neurology as an outpatient and is maintained on Keppra with no recurrent episodes when she has been compliant with her dosing schedule  -Continue Keppra 500 mg at bedtime, 250 mg every morning                 Diet:  Regular adult diet  DVT Prophylaxis: Pneumatic Compression Devices  [place] Catheter: Not present  Lines: None     Cardiac Monitoring: None  Code Status:  Full code    Medical Decision Making       Please see A&P for additional details of medical decision making.        Clinically Significant Risk Factors                              # Overweight: Estimated body mass index is 25.18 kg/m  as calculated from the following:    Height as of this encounter: 1.676 m (5' 6\").    Weight as of this encounter: 70.8 kg (156 lb)., PRESENT ON ADMISSION          Medically Ready for Discharge: Anticipated in 2-4 Days, pending improvement in symptoms and clearance from neurology    Savanah Ayoub MD  Text Page (7am - 6pm, M-F)    Securely message with the Vocera Web Console (learn more here)  Text page via [employer] Paging/Directory      Interval History   Today patient notes that she has had recurrent headache, pain in neck, back of thighs just like before with no improvement in symptoms overall.  However she does appear comfortable, laying in bed and able to converse fluently.  Awaiting angiogram this afternoon.  No fevers.    -Data reviewed today: I reviewed all new labs and imaging results over the last 24 hours. I personally reviewed CT scan with result as noted above and MRI scan with result as noted above    Physical Exam   Temp: 97.7  F (36.5  C) Temp src: Oral BP: 105/69 Pulse: 60   Resp: 16 SpO2: 99 % O2 Device: None (Room air)    Vitals:    12/04/24 1831   Weight: 70.8 kg (156 lb)     Vital Signs with Ranges  Temp:  [97.2  F (36.2  C)-98.2  F (36.8  C)] 97.7  F (36.5  C)  Pulse:  [60-68] 60  Resp:  " [14-16] 16  BP: ()/(66-78) 105/69  SpO2:  [98 %-100 %] 99 %  I/O last 3 completed shifts:  In: 480 [P.O.:480]  Out: -     Constitutional: Alert, appears comfortable, in no acute distress  Respiratory: Non labored breathing, clear to auscultation bilaterally, no crackles or wheezes  Cardiovascular: Heart sounds regular rate and rhythm, no murmurs, no leg edema  GI: Abdomen is soft, non distended, non tender. Normal BS  Skin/Integumen: no rashes, no pressure sores  Neuro: alert, converses appropriately, moving all extremities, fluent speech, no facial asymmetry  Psych: mood and affect appropriate      Medications   Current Facility-Administered Medications   Medication Dose Route Frequency Provider Last Rate Last Admin     Current Facility-Administered Medications   Medication Dose Route Frequency Provider Last Rate Last Admin    levETIRAcetam (KEPPRA) tablet 250 mg  250 mg Oral QAM Pompa, Zack Castaneda MD   250 mg at 12/06/24 0810    levETIRAcetam (KEPPRA) tablet 500 mg  500 mg Oral At Bedtime Pompa, Zack Castaneda MD   500 mg at 12/05/24 2137    progesterone (PROMETRIUM) capsule 100 mg  100 mg Oral At Bedtime Trena Peres MD   100 mg at 12/05/24 2320    sodium chloride (PF) 0.9% PF flush 3 mL  3 mL Intracatheter Q8H Pompa, Zack Castaneda MD   3 mL at 12/06/24 1255       Data   Recent Labs   Lab 12/04/24 2018   WBC 6.2   HGB 13.3   MCV 94         POTASSIUM 4.1   CHLORIDE 101   CO2 27   BUN 12.2   CR 1.12*   ANIONGAP 11   HUMZA 9.7   GLC 99       Imaging  No results found for this or any previous visit (from the past 24 hours).

## 2024-12-06 NOTE — IR NOTE
Interventional Radiology Intra-procedural Nursing Note    Patient Name: Keerthi Riddle  Medical Record Number: [id number]  Today's Date: December 6, 2024    Procedure consented for : Diagnostic cerebral angiogram with moderated sedation  Start time: 1637  End time: 1801  Report provided to: Neuro  RN  Patient depart time and location: 1810 to 720    Note: Patient entered Interventional Radiology Suite number 3 via cart. Patient awake, alert and oriented. Assisted onto procedural table in supine position. Prepped and draped.  Dr. Goodman and Huber  in room. Time out and procedure started. Vital signs stable. Telemetry reading SR.    Procedure well tolerated by patient without complications. Procedure end with debrief by Dr. Goodman.  Pressure held until hemostasis achieved. 11 cc TR band dressing applied to right wrist.  Angio seal  gauze and Tegaderm to right groin     2 hours bedrest.    Administered medication totals:  Lidocaine 1% 2 mL Intradermal  Heparin 1000 units IVP  Heparin 3000 Units Nitroglycerrin 200 mcg Verapamil 2.5 mg intra arterial   Versed 4 mg IVP  Fentanyl 150 mcg IVP    Last dose of sedation administered at 1743.

## 2024-12-07 LAB
ANION GAP SERPL CALCULATED.3IONS-SCNC: 11 MMOL/L (ref 7–15)
BUN SERPL-MCNC: 9.9 MG/DL (ref 6–20)
CALCIUM SERPL-MCNC: 8.7 MG/DL (ref 8.8–10.4)
CHLORIDE SERPL-SCNC: 107 MMOL/L (ref 98–107)
CREAT SERPL-MCNC: 0.76 MG/DL (ref 0.51–0.95)
EGFRCR SERPLBLD CKD-EPI 2021: >90 ML/MIN/1.73M2
GLUCOSE SERPL-MCNC: 135 MG/DL (ref 70–99)
HCO3 SERPL-SCNC: 21 MMOL/L (ref 22–29)
MAGNESIUM SERPL-MCNC: 2.2 MG/DL (ref 1.7–2.3)
POTASSIUM SERPL-SCNC: 3.6 MMOL/L (ref 3.4–5.3)
SODIUM SERPL-SCNC: 139 MMOL/L (ref 135–145)

## 2024-12-07 PROCEDURE — 99232 SBSQ HOSP IP/OBS MODERATE 35: CPT | Mod: GC | Performed by: STUDENT IN AN ORGANIZED HEALTH CARE EDUCATION/TRAINING PROGRAM

## 2024-12-07 PROCEDURE — 258N000003 HC RX IP 258 OP 636: Performed by: INTERNAL MEDICINE

## 2024-12-07 PROCEDURE — 83735 ASSAY OF MAGNESIUM: CPT | Performed by: INTERNAL MEDICINE

## 2024-12-07 PROCEDURE — 250N000013 HC RX MED GY IP 250 OP 250 PS 637: Performed by: PSYCHIATRY & NEUROLOGY

## 2024-12-07 PROCEDURE — 82374 ASSAY BLOOD CARBON DIOXIDE: CPT | Performed by: INTERNAL MEDICINE

## 2024-12-07 PROCEDURE — 250N000013 HC RX MED GY IP 250 OP 250 PS 637: Performed by: INTERNAL MEDICINE

## 2024-12-07 PROCEDURE — 120N000001 HC R&B MED SURG/OB

## 2024-12-07 PROCEDURE — 99232 SBSQ HOSP IP/OBS MODERATE 35: CPT | Performed by: INTERNAL MEDICINE

## 2024-12-07 PROCEDURE — 80048 BASIC METABOLIC PNL TOTAL CA: CPT | Performed by: INTERNAL MEDICINE

## 2024-12-07 PROCEDURE — 80051 ELECTROLYTE PANEL: CPT | Performed by: INTERNAL MEDICINE

## 2024-12-07 PROCEDURE — 36415 COLL VENOUS BLD VENIPUNCTURE: CPT | Performed by: INTERNAL MEDICINE

## 2024-12-07 RX ORDER — SODIUM CHLORIDE 9 MG/ML
INJECTION, SOLUTION INTRAVENOUS CONTINUOUS
Status: ACTIVE | OUTPATIENT
Start: 2024-12-07 | End: 2024-12-07

## 2024-12-07 RX ORDER — CALCIUM CARBONATE 500 MG/1
500 TABLET, CHEWABLE ORAL DAILY PRN
Status: DISCONTINUED | OUTPATIENT
Start: 2024-12-07 | End: 2024-12-08 | Stop reason: HOSPADM

## 2024-12-07 RX ORDER — SUMATRIPTAN 50 MG/1
50 TABLET, FILM COATED ORAL ONCE
Status: COMPLETED | OUTPATIENT
Start: 2024-12-07 | End: 2024-12-07

## 2024-12-07 RX ADMIN — ACETAMINOPHEN 650 MG: 325 TABLET, FILM COATED ORAL at 05:12

## 2024-12-07 RX ADMIN — ACETAMINOPHEN 650 MG: 325 TABLET, FILM COATED ORAL at 17:52

## 2024-12-07 RX ADMIN — SUMATRIPTAN SUCCINATE 50 MG: 50 TABLET ORAL at 12:23

## 2024-12-07 RX ADMIN — CALCIUM CARBONATE (ANTACID) CHEW TAB 500 MG 500 MG: 500 CHEW TAB at 20:33

## 2024-12-07 RX ADMIN — SUMATRIPTAN SUCCINATE 50 MG: 50 TABLET ORAL at 21:11

## 2024-12-07 RX ADMIN — ACETAMINOPHEN 650 MG: 325 TABLET, FILM COATED ORAL at 01:11

## 2024-12-07 RX ADMIN — LEVETIRACETAM 250 MG: 250 TABLET, FILM COATED ORAL at 09:15

## 2024-12-07 RX ADMIN — LEVETIRACETAM 500 MG: 500 TABLET, FILM COATED ORAL at 21:11

## 2024-12-07 RX ADMIN — PROGESTERONE 100 MG: 100 CAPSULE ORAL at 21:10

## 2024-12-07 RX ADMIN — SODIUM CHLORIDE: 9 INJECTION, SOLUTION INTRAVENOUS at 09:18

## 2024-12-07 ASSESSMENT — ACTIVITIES OF DAILY LIVING (ADL)
ADLS_ACUITY_SCORE: 19
ADLS_ACUITY_SCORE: 18
ADLS_ACUITY_SCORE: 19
ADLS_ACUITY_SCORE: 18
ADLS_ACUITY_SCORE: 19
ADLS_ACUITY_SCORE: 21
ADLS_ACUITY_SCORE: 19

## 2024-12-07 NOTE — PROGRESS NOTES
Neurology Note  [facility]    Patient Name: Keerthi Riddle  MRN: [id number]  : 1980      Subjective:  She was sleeping upon my evaluation.  She is easily arousable and says her headache is better.  She feels as a 3 out of a 10.  When she had her headache she initially ate some aged cheese and thinks it could have triggered a migraine.  She did have some nausea and vomiting afterwards.  It sounds like this could have been a migraine triggered by the cheese.  She also felt much better after she received a migraine cocktail initially in the emergency room.    Summary:  Keerthi Riddle is a 44 year old female who presents to [facility] department for evaluation of headache since .     Keerthi has a history of nocturnal seizures for which she is maintained on Keppra, and while on Keppra has not had issues with seizures.  Seizures initially diagnosed in  with some overlap concern of potential alcohol abuse at that time.     Patient had new onset headache starting .  Sudden onset, not associated with trauma or activity.     Reports she had pain throughout her whole head, primarily bifrontal and at base of neck.  With persistent waxing and waning headache since , she contacted the nurse line through her [facility] system on , was seen  at urgent care and prescribed acetaminophen, December 3 for follow-up with her primary care provider.  At December 3 visit describes some stabbing pain behind her left eye and tingling of her scalp as well as tightness in her hamstring when she would have headache pain.  Was rotating Advil 3 600 mg with Tylenol 650 mg every 6 hours.  At that visit she also reported having a stiff neck with her headache, noted a plan to follow with a chiropractor after PCP visit.  She received Toradol for her headache.     At chiropractor visit integrative health visit, it was noted that her  headache was associated with an aura.  Came on while her head was flexed while dying her hair, and with her abrupt pain she subsequently had an episode of emesis.     After chiropractor visit, she was placed in position with her head rotated and extended for 30 seconds and reported feeling dizzy.  Was felt to be a contraindication for cervical spine manipulation given positive vertebral artery test.  She also noted pulsations with her headache and a bilateral sciatica type pain.  Chiropractor recommended follow-up with neurology again with her sudden onset headache symptoms.     When she was able to get her neurology visit scheduled, it was not available for 2 weeks.  Contacted her family medicine clinic 12/4/2024 regarding this, and subsequently presented to the emergency department for evaluation.     In the emergency department she had a CT which was unrevealing, underwent lumbar puncture with red cells throughout CSF.  Stroke neurology was contacted and recommended imaging studies including MRI, MRV.  These were all unrevealing for any occult bleeding source or venous thrombosis.  No evidence of ischemia, mass, hemorrhage either intraparenchymal or intraventricular, no dissection, no significant stenoses, no aneurysms, no vascular malformations.  Past Medical History:  History reviewed. No pertinent past medical history.  Past Surgical History:  History reviewed. No pertinent surgical history.  Medications:  No current outpatient medications on file.     Allergies:  Allergies   Allergen Reactions    Gluten Meal Diarrhea    Milk (Cow) GI Disturbance    Penicillins Nausea and Vomiting and GI Disturbance     Specifically amoxicillin, reports has been an issue with all penicillins in recent years.    Wheat [Wheat] Unknown     Family History:  No family history on file.  Social History:  Social History     Socioeconomic History    Marital status: Single     Spouse name: Not on file    Number of children: Not on file     Years of education: Not on file    Highest education level: Not on file   Occupational History    Not on file   Tobacco Use    Smoking status: Not on file    Smokeless tobacco: Not on file   Substance and Sexual Activity    Alcohol use: Not on file    Drug use: Not on file    Sexual activity: Not on file   Other Topics Concern    Not on file   Social History Narrative    Not on file     Social Drivers of Health     Financial Resource Strain: Low Risk  (12/5/2024)    Financial Resource Strain     Within the past 12 months, have you or your family members you live with been unable to get utilities (heat, electricity) when it was really needed?: No   Food Insecurity: Low Risk  (12/5/2024)    Food Insecurity     Within the past 12 months, did you worry that your food would run out before you got money to buy more?: No     Within the past 12 months, did the food you bought just not last and you didn t have money to get more?: No   Transportation Needs: Low Risk  (12/5/2024)    Transportation Needs     Within the past 12 months, has lack of transportation kept you from medical appointments, getting your medicines, non-medical meetings or appointments, work, or from getting things that you need?: No   Physical Activity: Not on File (8/26/2019)    Received from [facility]    Physical Activity     Physical Activity: 0   Stress: Not on File (8/26/2019)    Received from [facility]    Stress     Stress: 0   Social Connections: Not on File (8/26/2019)    Received from [facility]    Social Connections     Social Connections and Isolation: 0   Interpersonal Safety: High Risk (12/7/2024)    Interpersonal Safety     Do you feel physically and emotionally safe where you currently live?: No     Within the past 12 months, have you been hit, slapped, kicked or otherwise physically hurt by someone?: No     Within the past 12 months, have you been humiliated or emotionally abused in other ways by your partner or ex-partner?: No   Housing Stability:  "Low Risk  (12/5/2024)    Housing Stability     Do you have housing? : Yes     Are you worried about losing your housing?: No         Vital Signs:  BP 91/53 (BP Location: Left arm)   Pulse 66   Temp 98.8  F (37.1  C) (Oral)   Resp 16   Ht 1.676 m (5' 6\")   Wt 70.8 kg (156 lb)   SpO2 98%   BMI 25.18 kg/m        Neurological examination  Mental Status: The patient is alert and oriented. Recent memory is normal. The person is  attentive with normal concentration. Language is fluent. Speech is of normal brady and  character. The speech is nondysarthric. Fund of knowledge appears normal  Cranial Nerve I: Not tested.  Cranial Nerve II: The pupils are equally round and reactive to light.  Cranial Nerves III, IV, VI: The extraocular movements are full in all directions of gaze without  ophthalmoplegia or nystagmus.  Cranial Nerve V: Light touch is intact and symmetric in the V1, V2, V3 divisions of both  trigeminal nerves.  Cranial Nerve VII: Facial movements are symmetric.  Cranial Nerve XI: Normal shoulder shrug.  Muscle Strength: The strength was 5/5 in upper and lower extremities bilaterally.  Reflexes: The reflexes are 2/4 for biceps, patellar tendon reflexes bilaterally and  symmetrically.  Sensory: The sensory examination is normal for light touch bilaterally and symmetrically.  Cerebellar: The cerebellar examination is normal to finger to nose test.    Review of Diagnostics:      MRV Brain wo & w Contrast    Result Date: 12/5/2024  EXAM: MRV BRAIN  W/O and W CONTRAST LOCATION: [facility] DATE: 12/5/2024 INDICATION:     IMPRESSION: HEAD MRV: 1.  No dural venous sinus thrombosis or significant stenosis.    MR Brain w/o & w Contrast    Result Date: 12/5/2024  EXAM: MR BRAIN W/O and W CONTRAST, MRA NECK (CAROTIDS) W/O and W CONTRAST, MRA BRAIN ([place]) W/O CONTRAST LOCATION: [facility] DATE: 12/5/2024 INDICATION:     IMPRESSION: HEAD MRI: 1.  No " discrete mass lesion, hemorrhage or focal area of acute ischemia. 2.  No significant abnormal signal or abnormal enhancement visualized. HEAD MRA: 1.  No discrete large vessel occlusion, significant stenosis, aneurysm or high flow vascular malformation involving the arteries of the [place]. NECK MRA: 1.  Normal configuration of the great vessels off the aortic arch with no significant stenosis of their origins. 2.  No significant stenosis or irregularity involving the arteries of the neck. 3.  No radiographic evidence of dissection.    MRA Angiogram Head w/o Contrast    Result Date: 12/5/2024  EXAM: MR BRAIN W/O and W CONTRAST, MRA NECK (CAROTIDS) W/O and W CONTRAST, MRA BRAIN ([place]) W/O CONTRAST LOCATION: [facility] DATE: 12/5/2024 INDICATION:     IMPRESSION: HEAD MRI: 1.  No discrete mass lesion, hemorrhage or focal area of acute ischemia. 2.  No significant abnormal signal or abnormal enhancement visualized. HEAD MRA: 1.  No discrete large vessel occlusion, significant stenosis, aneurysm or high flow vascular malformation involving the arteries of the [place]. NECK MRA: 1.  Normal configuration of the great vessels off the aortic arch with no significant stenosis of their origins. 2.  No significant stenosis or irregularity involving the arteries of the neck. 3.  No radiographic evidence of dissection.    .    CT Head w/o Contrast    Result Date: 12/4/2024  EXAM: CT HEAD W/O CONTRAST, CTA HEAD NECK W CONTRAST LOCATION: [facility] DATE: 12/4/2024 INDICATION: Headache and neck pain COMPARISON: None CONTRAST: 67mL Isovue 370 TECHNIQUE: Head and neck CT angiogram with IV contrast. Noncontrast head CT followed by axial helical CT images of the head and neck vessels obtained during the arterial phase of intravenous contrast administration. Axial 2D reconstructed images and multiplanar 3D MIP reconstructed images of the head and neck  vessels were performed by the technologist. Dose reduction techniques were used. All stenosis measurements made according to NASCET criteria unless otherwise specified. FINDINGS: NONCONTRAST HEAD CT: INTRACRANIAL CONTENTS: Portions of the posterior fossa are obscured by streak artifact. No acute intracranial hemorrhage, extraaxial collection, or mass effect. No evidence of an acute transcortical confluent infarct. Normal parenchymal attenuation. Normal ventricles and sulci for age. VISUALIZED ORBITS/SINUSES/MASTOIDS: No acute intraorbital finding. No significant paranasal sinus or mastoid mucosal disease. BONES/SOFT TISSUES: No acute abnormality. HEAD CTA: ANTERIOR CIRCULATION: No high-grade stenosis, occlusion, aneurysm, or high-flow vascular malformation. A severely hypoplastic P1 segment of the left posterior cerebral artery with a patent left posterior communicating artery. POSTERIOR CIRCULATION: No high-grade stenosis, occlusion, aneurysm, or high-flow vascular malformation. A hypoplastic right vertebral artery predominantly terminating as the right posterior inferior cerebellar artery. DURAL VENOUS SINUSES: Expected enhancement of the major dural venous sinuses. Please note that this exam is not specifically tailored for the assessment of the intracranial venous structures. NECK CTA: RIGHT CAROTID: No hemodynamically significant stenosis or dissection. LEFT CAROTID: No hemodynamically significant stenosis or dissection. VERTEBRAL ARTERIES: No focal high-grade stenosis or dissection. Balanced vertebral arteries. AORTIC ARCH: Incidentally noted aberrant right subclavian artery without aneurysmal dilatation at its origin. A common origin of both common carotid arteries. Patent visualized thoracic aorta, aortic arch, and proximal great vessels without significant atherosclerotic disease or stenosis. NONVASCULAR STRUCTURES: Straightening of the normal cervical lordosis with 2-3 mm likely degenerative  anterolisthesis at C6-C7 in the setting of mild-moderate left C5-C7 facet arthrosis. Multilevel interbody degenerative change including mild-moderate intervertebral disc height loss at C4-C5 and C6-C7, mild at C5-C6. While assessment is limited due to streak artifact, there is likely mild spinal canal stenosis at C5-C6. Moderate left C5-C7 foraminal narrowing, mild-moderate at C4-C5 on the left. Clear  visualized lungs.     IMPRESSION: HEAD CT: 1.  No acute intracranial abnormality. HEAD CTA: 1.  No large vessel occlusion, high-grade stenosis, aneurysm, or high-flow vascular malformation. NECK CTA: 1.  No hemodynamically significant stenosis or dissection in the neck vessels. 2.  Incidental aberrant right subclavian artery. 3.  Multilevel cervical spondylosis, as described.      IMPRESSION: HEAD CT: 1.  No acute intracranial abnormality. HEAD CTA: 1.  No large vessel occlusion, high-grade stenosis, aneurysm, or high-flow vascular malformation. NECK CTA: 1.  No hemodynamically significant stenosis or dissection in the neck vessels. 2.  Incidental aberrant right subclavian artery. 3.  Multilevel cervical spondylosis, as described.        Complete Blood Count:    Recent Labs   Lab Test 12/04/24 2018   WBC 6.2   RBC 4.23   HGB 13.3   HCT 39.9      LYMPH 29          Recent Labs   Lab Test 12/04/24 2018   WBC 6.2   HGB 13.3   MCV 94         Recent Labs   Lab Test 12/06/24  1607 12/04/24 2018   NA  --  139   POTASSIUM  --  4.1   CHLORIDE  --  101   CO2  --  27   BUN  --  12.2   CR  --  1.12*   ANIONGAP  --  11   HUMZA  --  9.7   GLC 81 99     CMP:  No lab results found in last 7 days.      Impression:  Ms. Riddle is a 44 year old With a past medical history of seizures was admitted December for ongoing headache since November 26.  She has had nausea and vomiting with these headaches.  Extensive workup has been unremarkable besides possible aseptic meningitis noted on CSF testing.  Differential diagnosis can  be migraine related or due to aseptic meningitis.    Plan:    - Will try treatments for migraines such as sumatriptan and see if this will help.  She can use this as outpatient as well.  -Stroke team is following for possible reversible cerebral vasoconstriction syndrome and completed angiogram but results are still pending.  -Aseptic meningitis: This can be the cause of headache and treatment is mostly supportive.  Can consider infectious disease evaluation.  -History of seizures: Continue Keppra 750 mg daily.  -No further recommendations at this time.  General neurology will sign off. Please contact us with any questions or concerns. Thank you    Viral Benton MD  Neurology      Thank you very much  for allowing me to participate in the care of this patient.

## 2024-12-07 NOTE — PLAN OF CARE
Reason for Admission: [place] HA, possible RVCS, POD 1 cerebral angio   Cognitive/Mentation: A/Ox 4  Neuros/CMS: Stable w/ generalized weakness, blurry vision  VS: VSS on RA, SBP goal <160. Soft BP's, prn NS bolus given   GI/: BS audible, + flatus, no BM. Voiding adequately. Continent.  Pulmonary: LS clear  Pain: Headache, prn Tylenol and one time dose Ibuprofen given.   Drains/Lines: R PIV SL  Skin: R groin site, dressing intact. R TR band site FADY.   Activity: Assist x 1/SBA in the room.  Diet: Regular diet with thin liquids. Takes pills whole.   Therapies recs: Home  Discharge: Pending medical readiness  Aggression Stoplight Tool: Green  End of shift summary: Pt up and moving post bedrest. Family at bedside, helpful w/ cares. Seizure precautions maintained. Plan for CT.

## 2024-12-07 NOTE — PROCEDURES
[facility]     Endovascular Surgical Neuroradiology Post-Procedure Note    Pre-Procedure Diagnosis: Subarachnoid hemorrhage  Post-Procedure Diagnosis: as above    Procedure:   Diagnostic cervicocerebral angiography    Reason for delay:  No delay    Findings: No intracranial aneurysms, early venous drainage, arteriovenous malformation or fisulas seen throughout the anterior and posterior circulation. There is a right aberrant subclavian artery.     Plan:    Bedrest 2 hours  SBP goal <160  No need for repeat angiogram  Resume inpatient cares  TR band removal protocol      Primary Surgeon: Dr. Kaia Ngo  Secondary Surgeon: Not applicable  Secondary Surgeon Review: None  Fellow: Ara Goodman MD  Additional Assistants: none    Prior to the start of the procedure and with procedural staff participation, I verbally confirmed: the patient s identity using two indicators, relevant allergies, that the procedure was appropriate and matched the consent or emergent situation, and that the correct equipment/implants were available. Immediately prior to starting the procedure I conducted the Time Out with the procedural staff and re-confirmed the patient s name, procedure, and site/side. (The Joint Commission universal protocol was followed.)  Yes    PRU value: Not applicable    Anesthesia: Conscious Sedation  Medications: Heparin 4000U total (1000U IV, 3000U IA) , Lidocaine 14mL , Verapamil 2.5mg IA, Nitroglycerin 200mcg IA,   Puncture site: Right Femoral Artery, Right Radial Artery    Fluoroscopy time (minutes): 37.5  Radiation dose (mGy):  560.77     Contrast amount (mL):  150      Estimated blood loss (mL): 25    Closure:  6F Angioseal, TR band    Bedrest start time 1801 and 2 hours bedrest completed at 2001    Disposition: Will be followed in hospital by the Neurology team.        Sedation Post-Procedure Summary    The patient was reevaluated immediately before administering moderate or  deep sedation or anesthesia.    Sedatives: Fentanyl 150mcg IV and Midazolam (Versed) 4mg IV     Vital signs and pulse oximetry were monitored and remained stable throughout the procedure, and sedation was maintained until the procedure was complete.  The patient was monitored by staff until sedation discharge criteria were met.    Patient tolerance: Patient tolerated the procedure well with no immediate complications.    Time of sedation in minutes: 84 minutes from beginning to end of physician one to one monitoring.    Ara Goodman MD    Use [employer] to contact: Neuro IR Fellow ([place])  Fellow's personal cell phone number provided to bedside RN.

## 2024-12-07 NOTE — PROGRESS NOTES
[facility]    Vascular Neurology Progress Note    Interval History     No overnight issues, yesterday the angio did not show any evidence of vascular lesion in the brain, no evidence of aneurysm or AV malformation to explain any possibility of intracranial hemorrhage.    Hospital Course     Chief complaint: Headache    The patient is a 44-year-old female with past medical history of chronic neck and back pain, nocturnal seizure on Keppra, IBS.  She presented because of acute onset of headache that started in November 26 when she was dying her hair with subsequent episode of vomiting.  The patient described that the headache was a thunderclap headache maximized intensity reaching within seconds to minutes, localized to a circumferential band directly superior to forehead bitemporal and retro-orbital.  Seen at urgent care for headache and December 3 negative for COVID influenza.  She presented to the ED because of the concern of not receiving the test she knees, dedicated neuroimaging including CT head without contrast, CT angiography of head and neck, MRA, MRV and MRI of the brain with and without contrast did not reveal any evidence of vascular injury.  Lumbar puncture was performed and CSF studies showed pinky color, elevated red blood cells and proteins, elevated lymphocytes, normal glucose, total nucleated cells were 151.  She got cerebral angiogram to explore the presence of aneurysm however the DSA was negative for any vascular injury.  Meningitis panel was negative.  Aseptic meningitis was suspected.    Assessment and Plan     1.  Aseptic meningitis  -Avoid herbal supplements and any recent triggers or new supplements or food that recently started preceding the headache  -Avoid Imitrex, the patient reports worsening headache with the medication  -No further workup from vascular neurology is indicated  -The rest of the management per general neurology    Patient Follow-up    - in  "4 weeks with general neurology ([phone])    No further stroke evaluation is recommended, so we will sign off. Please contact us with any additional questions.    The Stroke Staff is Dr. Stratton.    Oanh Kapoor MD  Vascular Neurology Fellow    To page me or covering stroke neurology team member, click here: [facility]  Choose \"On Call\" tab at top, then select \"NEUROLOGY/ALL SITES\" from middle drop-down box, press Enter, then look for \"stroke\" or \"telestroke\" for your site.    Physical Examination     Temp: 98  F (36.7  C) Temp src: Oral BP: 98/62 Pulse: 53   Resp: 15 SpO2: 93 % O2 Device: None (Room air)      Neurologic  Mental Status:  alert, oriented x 3, follows commands, speech clear and fluent, naming and repetition normal  Cranial Nerves:  visual fields intact, PERRL, EOMI with normal smooth pursuit, facial sensation intact and symmetric, facial movements symmetric, hearing not formally tested but intact to conversation, palate elevation symmetric and uvula midline, no dysarthria, shoulder shrug strong bilaterally, tongue protrusion midline  Motor:  normal muscle tone and bulk, no abnormal movements, able to move all limbs spontaneously, strength 5/5 throughout upper and lower extremities, no pronator drift  Reflexes:  toes down-going  Sensory:  light touch sensation intact and symmetric throughout upper and lower extremities, no extinction on double simultaneous stimulation   Coordination:  normal finger-to-nose and heel-to-shin bilaterally without dysmetria, rapid alternating movements symmetric  Station/Gait:  deferred      Imaging/Labs   (Bolded imaging and labs new and/or personally reviewed or re-reviewed by me today)    MRI and/or Head CT CTA Head/Neck:   HEAD CT:  1.  No acute intracranial abnormality.   HEAD CTA:   1.  No large vessel occlusion, high-grade stenosis, aneurysm, or high-flow vascular malformation.   NECK CTA:  1.  No hemodynamically significant stenosis or dissection in the neck " vessels.  2.  Incidental aberrant right subclavian artery.  3.  Multilevel cervical spondylosis, as described     MRA Brain/Neck:   HEAD MRI:   1.  No discrete mass lesion, hemorrhage or focal area of acute ischemia.  2.  No significant abnormal signal or abnormal enhancement visualized.   HEAD MRA:   1.  No discrete large vessel occlusion, significant stenosis, aneurysm or high flow vascular malformation involving the arteries of the [place].   NECK MRA:  1.  Normal configuration of the great vessels off the aortic arch with no significant stenosis of their origins.  2.  No significant stenosis or irregularity involving the arteries of the neck.  3.  No radiographic evidence of dissectio     MRV Brain w/o:   HEAD MRV:   1.  No dural venous sinus thrombosis or significant stenosis.      Intracranial Vasculature No significant stenosis or occlusion, aneurysm, or high flow vascular malformation.    Cervical Vasculature No evidence of dissection. Left vertebral artery dominant, but both are patent throughout.

## 2024-12-07 NOTE — PLAN OF CARE
"Goal Outcome Evaluation:      Plan of Care Reviewed With: patient               Reason for Admission: thunderclap headache with vomiting, suspicion for subarachnoid hemorrhage     Cognitive/Mentation: A/Ox 4  Neuros/CMS: Intact ex bilateral Peripheral blurry vision, reported R face is more sensitive to touch once this shift.   VS: /83 (BP Location: Left arm, Patient Position: Supine, Cuff Size: Adult Regular)   Pulse 53   Temp 97.7  F (36.5  C) (Oral)   Resp 12   Ht 1.676 m (5' 6\")   Wt 70.8 kg (156 lb)   SpO2 100%   BMI 25.18 kg/m  . .  /GI: Voiding,Continent. Last BM 12/5.   Pulmonary: LS clear.  Pain:. Headache, managed with PRN Tylenol. Some discomfort at 2/10 in R wrist and R groin post procedure, Rex ANAND with endovascular neuroradiology at bedside and aware.     Drains/Lines: IV  Skin: R radial site with TR band, R groin site dry and intact  Activity: Strict bedrest for 2 hours with RLE straight   Diet: Clears with thin liquids. Takes pills whole. To be advanced post procedure.    Discharge: pending     Aggression Stoplight Tool: green    End of shift summary: Pt had diagnostic cervicocerebral angio this shift. Back on unit at 1830.     MD Notification    Notified Person: MD    Notified Person Name: Rex ANAND     Notification Date/Time: December 6, 2024 at 1903    Notification Interaction: vocera message     Purpose of Notification: fyi dorsi and plantar moderate on RLE and HR at 49. Orders to page for less than 50.    Orders Received: Provider aware, assessed Pt at bedside, no change to plan of care.    Comments:      "

## 2024-12-07 NOTE — PROGRESS NOTES
[facility]    Medicine Progress Note - Hospitalist Service       Date of Admission:  12/4/2024    Assessment & Plan   Keerthi Riddle is a 44 year old female with hx of seizures who was admitted on 12/4/2024 for evaluation of ongoing headache with meningismus    Brief HPI  Presented with 1 week hx of ongoing headache after developing a thunderclap headache on 11/26; initially associated with nausea/vomiting, tinnitus, neck stiffness, and sensation of tightness in her hamstrings when standing without clear sciatica; positional worsening.    In the ED, VSS. Labs significant only for Cr 1.12. Multiple imaging studies this admission (head CT, CTA, brain MRI, MRA/MRV) negative for acute pathology. Underwent LP in the ED showing xanthochromic CSF.  Meningitis/encephalitis panel negative. UA bland. CRP normal      Intractable headache, unclear etiology  *Ddx occult ICH vs RCVS vs migraine vs aseptic meningitis. Pt notes most significant improvement in symptoms following administration of IV fluids  *General Neurology initially consulted; recommended supportive care with pain control and input from stroke team  *Cerebral angiogram (12/6) negative for acute pathology  - conts on supportive cares: IV fluids, pain control  - trial of sumatriptan as per Neuro today, 12/7  - repeat IV fluids, 12/7     Seizure disorder  *Chronic and stable on Keppra. Recent Keppra level (11/4) therapeutic       Diet: Regular Diet Adult    DVT Prophylaxis: Pneumatic Compression Devices  [place] Catheter: Not present  Lines: None     Cardiac Monitoring: None  Code Status: Full Code      Disposition Plan   Medically Ready for Discharge: Anticipated Tomorrow           Caprice Jama MD  Hospitalist Service  [facility]  Securely message with [email or web address] (more info)  Text page via [employer] Paging/Directory   ______________________________________________________________________    Interval History   No  acute events overnight. Headache overall improved - taking Tylenol prn. Neuro to try trial of sumatriptan today. Feels IV fluids have helped the most, and would like to try that again today. Plan discharge tomorrow if symptoms remain stable. Pt and  in agreement with plan    Physical Exam   Vital Signs: Temp: 98.8  F (37.1  C) Temp src: Oral BP: 91/53 Pulse: 66   Resp: 16 SpO2: 98 % O2 Device: None (Room air)    Weight: 156 lbs 0 oz  Constitutional: Resting in bed, NAD  HEENT: Sclera white, MMM  Respiratory: Breathing non-labored. Lungs CTAB - no wheezes, crackles, or rhonchi  Cardiovascular: Heart RRR, no m/r/g. No pedal edema  GI: +BS, abd soft/NT  Skin/Integument: No rash  Musculoskeletal: Normal muscle bulk and tone  Neuro: Alert and appropriate, MAXWELL  Psych: Calm and cooperative    Data reviewed today: I reviewed all medications, new labs and imaging results over the last 24 hours. I personally reviewed the cerebral angiogram image(s) showing no acute pathology .    Data   Recent Labs   Lab 12/06/24  1607 12/04/24 2018   WBC  --  6.2   HGB  --  13.3   MCV  --  94   PLT  --  169   NA  --  139   POTASSIUM  --  4.1   CHLORIDE  --  101   CO2  --  27   BUN  --  12.2   CR  --  1.12*   ANIONGAP  --  11   HUMZA  --  9.7   GLC 81 99         No results found for this or any previous visit (from the past 24 hours).    Medications   Current Facility-Administered Medications   Medication Dose Route Frequency Provider Last Rate Last Admin    Reason statin not prescribed   Other DOES NOT GO TO Ara Atwood MD        sodium chloride 0.9 % infusion   Intravenous Continuous Caprice Jama  mL/hr at 12/07/24 0918 New Bag at 12/07/24 0918     Current Facility-Administered Medications   Medication Dose Route Frequency Provider Last Rate Last Admin    levETIRAcetam (KEPPRA) tablet 250 mg  250 mg Oral [facility] Zack Pompa MD   250 mg at 12/07/24 0915    levETIRAcetam (KEPPRA) tablet 500 mg  500 mg Oral At  Bedtime Pompa, Zack Castaneda MD   500 mg at 12/06/24 2208    progesterone (PROMETRIUM) capsule 100 mg  100 mg Oral At Bedtime Trena Peres MD   100 mg at 12/06/24 2208    sodium chloride (PF) 0.9% PF flush 3 mL  3 mL Intracatheter Q8H Pompa, Zack Castaneda MD   3 mL at 12/06/24 2041

## 2024-12-08 ENCOUNTER — HEALTH MAINTENANCE LETTER (OUTPATIENT)
Age: 44
End: 2024-12-08

## 2024-12-08 ENCOUNTER — APPOINTMENT (OUTPATIENT)
Dept: PHYSICAL THERAPY | Facility: CLINIC | Age: 44
End: 2024-12-08
Attending: INTERNAL MEDICINE
Payer: COMMERCIAL

## 2024-12-08 ENCOUNTER — HOSPITAL (OUTPATIENT)
Dept: NEUROLOGY | Facility: CLINIC | Age: 44
End: 2024-12-08
Payer: COMMERCIAL

## 2024-12-08 VITALS
BODY MASS INDEX: 25.07 KG/M2 | OXYGEN SATURATION: 96 % | DIASTOLIC BLOOD PRESSURE: 78 MMHG | WEIGHT: 156 LBS | HEART RATE: 80 BPM | RESPIRATION RATE: 16 BRPM | SYSTOLIC BLOOD PRESSURE: 109 MMHG | HEIGHT: 66 IN | TEMPERATURE: 98.3 F

## 2024-12-08 PROCEDURE — 99239 HOSP IP/OBS DSCHRG MGMT >30: CPT | Performed by: INTERNAL MEDICINE

## 2024-12-08 PROCEDURE — 250N000013 HC RX MED GY IP 250 OP 250 PS 637: Performed by: INTERNAL MEDICINE

## 2024-12-08 PROCEDURE — 97161 PT EVAL LOW COMPLEX 20 MIN: CPT | Mod: GP

## 2024-12-08 PROCEDURE — 97530 THERAPEUTIC ACTIVITIES: CPT | Mod: GP

## 2024-12-08 RX ORDER — SUMATRIPTAN SUCCINATE 25 MG/1
25 TABLET ORAL
Qty: 60 TABLET | Refills: 0 | Status: SHIPPED | OUTPATIENT
Start: 2024-12-08 | End: 2024-12-08

## 2024-12-08 RX ORDER — SUMATRIPTAN SUCCINATE 25 MG/1
25 TABLET ORAL
Qty: 60 TABLET | Refills: 0 | Status: SHIPPED | OUTPATIENT
Start: 2024-12-08

## 2024-12-08 RX ORDER — ACETAMINOPHEN 500 MG
500-1000 TABLET ORAL EVERY 6 HOURS PRN
Qty: 100 TABLET | Refills: 0 | Status: SHIPPED | OUTPATIENT
Start: 2024-12-08

## 2024-12-08 RX ADMIN — LEVETIRACETAM 250 MG: 250 TABLET, FILM COATED ORAL at 08:21

## 2024-12-08 RX ADMIN — ACETAMINOPHEN 650 MG: 325 TABLET, FILM COATED ORAL at 00:05

## 2024-12-08 RX ADMIN — ACETAMINOPHEN 650 MG: 325 TABLET, FILM COATED ORAL at 08:20

## 2024-12-08 ASSESSMENT — ACTIVITIES OF DAILY LIVING (ADL)
ADLS_ACUITY_SCORE: 21

## 2024-12-08 NOTE — OP NOTE
Date of procedure: 12/6/2024  Keerthi Riddle  Female, 44 year old, 1980  MRN: [id number]    Preoperative diagnosis:  1.  Thunderclap headache  2.  Subarachnoid hemorrhage    Postoperative diagnosis:   1.  Same  2.  Same    Title of procedures:  1.  Catheterization of right radial artery under ultrasound guidance (image stored in electronic medical record)  2.  Catheterization of right common femoral artery under ultrasound guidance (image stored in electronic medical record)  3.  Catheterization of right vertebral artery  4.  Catheterization of left subclavian artery  5.  Catheterization of left common carotid artery  6.  Catheterization of right common carotid artery  7.  Multiview diagnostic cerebral angiography  8.  Interpretation of images  9.  Percutaneous closure of right groin puncture site with 6 Polish Angio-Seal system    : Kaia Ngo MD    Fellow: Ara Goodman MD    Anesthesia: Conscious sedation and local anesthesia.  All IV medications were administered by the radiology nursing staff under my supervision.  The radiology nursing staff monitored the patient's vital signs throughout the procedure.    Sedation time: 84 minutes of 1: 1 attending monitoring time    Medications: Midazolam 4 mg IV, fentanyl 150 mcg IV, heparin 1000 units IV, heparin 3000 units intra-arterial, verapamil 2.5 mg intra-arterial, nitroglycerin 200 mcg intra-arterial, lidocaine 1% 14 mL local    Fluoroscopy time: 37.5 minutes    Fluoroscopy dose: 560.77 mGy    Contrast: 150 mL Isovue    History of present illness: Ms. plaster is a 44-year-old woman with sudden onset of thunderclap headache about 10 days ago.  There is associated neck stiffness and imaging studies did not show intracranial hemorrhage.  However, lumbar puncture showed xanthochromia.  She presents for diagnostic cerebral angiography to evaluate for intracranial aneurysms or vascular malformations.  Written and verbal consent was obtained  from the patient.  The patient has a history of seizures and is on antiepileptic medication.  Of note, the patient has an aberrant right subclavian artery.    Description of procedure: The patient was placed in the supine position on the angiography table.  Her right wrist and right groin were prepared and draped in the usual sterile fashion.  The patient passed an Nuno's test on the right side.  A timeout was performed.  Upon administration of conscious sedation, the right wrist was infiltrated with 1% lidocaine.  Ultrasound showed that the right radial artery is patent, and an image was stored in the permanent record.  Under ultrasound guidance, the right radial artery was punctured with a microneedle.  Seldinger technique was used to place a 5 Australian radial sheath, which was connected to heparinized saline flush.  A radial cocktail listed above was administered through the sheath.  A 5 Australian Delcid-1 catheter was advanced over a baby J Glidewire.  Under roadmap guidance, the right vertebral artery was catheterized.  Angiography was performed over the cranium in multiple planes.  The catheter was advanced over the wire into the aortic arch.  The catheter shape was reformatted in the aortic arch, and the left subclavian artery was catheterized.  We are unable to catheterize left vertebral artery.  Blood pressure cuff was inflated in the left arm and left subclavian artery injections were performed and angiography was completed over the cranium.     Next, we catheterized the right common carotid artery.  We advanced the catheter into the distal right common carotid artery over the wire.  Angiography was performed over the cranium.  We could not catheterize the left common carotid artery, which had a shared origin with the right.  The catheter was removed and we used a transfemoral approach to catheterize the left common carotid artery.  The Delcid-2 catheter was advanced over a 0.035 inch Glidewire and we  catheterized the left common carotid artery.  Angiography was performed over the cranium.  We recatheterized the left subclavian artery and positioned the Delcid-2 catheter closer to the left vertebral artery origin and angiography was repeated over the cranium through a left subclavian injection.  The catheter and groin sheath were removed and hemostasis was achieved in the groin using a 6 [place] Angio-Seal system.  The radial sheath was removed and hemostasis was achieved at the site using the TR band.  Blood loss was approximately 15 mL.  There were no immediate complications.  The patient was transported back to the [facility] without incident.    I was present for the entire procedure including the key portions.    Interpretation of images:               phases appear normal.  The visible branches of the external carotid artery appear normal.    Left common carotid artery injection, AP, lateral, oblique views over the cranium: The distal cervical intracranial segments of the left ICA appear normal.  The ophthalmic artery is first intradural branch.  The posterior communicating artery fills the posterior cerebral artery, consistent with a fetal configuration.  The ICA bifurcates into the anterior middle cerebral arteries, both of whose territories appear normal.  No aneurysms, vascular malformations, or stenosis are seen.  The capillary and venous phases appear normal to visible branches of the external carotid artery appear normal.    Left subclavian artery injection, AP, lateral, oblique views over the cranium: The left vertebral artery is dominant.  The distal segments of the left vertebral artery appear normal.  The PICA has an intradural origin.  The basilar artery essentially continues as the right posterior cerebral artery, as the left PCA has a fetal configuration.  No aneurysms or vascular malformations are seen.  The capillary and venous phases appear normal.    Right common femoral artery runoff: Insertion of the sheath is above the common femoral bifurcation.  There is no stenosis, dissection, or extravasation.    Overall impression:  1.  No intracranial aneurysms or vascular malformations are seen.  Correlated with the noninvasive imaging and clinical findings, the patient has a perimesencephalic nonaneurysmal subarachnoid hemorrhage.    2.  Aberrant right subclavian artery    Kaia Ngo MD  Endovascular surgical neuroradiology

## 2024-12-08 NOTE — PROGRESS NOTES
"Pt discharged to home with significant other at 1314. Discharge instructions provided. Significant other and Pt verbalized understanding of discharge instructions and necessary follow up appointments. Medications sent with Pt. Belongings sent with Pt.     Reason for Admission: thunderclap headache with vomiting, aseptic meningitis, POD2 cerebral angio  Cognitive/Mentation: A/Ox 4  Neuros/CMS: Intact ex blurry peripheral vision. Pt noted that she was having to focus on keeping LLE up with assessment. BLE 5/5 for strength. No weakness noted. General neurologist aware and evaluated Pt at bedside, no weakness noted. Ok to discharge.   VS:/78 (BP Location: Left arm, Patient Position: Standing, Cuff Size: Adult Regular)   Pulse 80   Temp 98.3  F (36.8  C) (Oral)   Resp 16   Ht 1.676 m (5' 6\")   Wt 70.8 kg (156 lb)   SpO2 96%   BMI 25.18 kg/m    /GI: Voiding,Continent. Last BM 12/8  Pulmonary: LS clear.  Pain:. Headache, managed with PRN Tylenol   Skin: R radial and R groin site, CMS intact. R groin dressing CDI.  Activity: SBA with significant other in the room. Home with assist.     Aggression Stoplight Tool: green     End of shift summary:  SW consult triggered due to it being charted that Pt responded no to feeling safe at home on the interpersonal safety screening on 12/7. Asked significant other to leave the room. Assessed Pt, Pt reported feeling safe at home denies being physically hurt or emotionally abused, no signs of abuse present. Pt reports has not answered yes to feeling unsafe at home previously. MD aware, SW consult canceled. HR was 50 this morning at 1135 HR around 55-80. Pt asymptomatic. MD aware, Pt ok to discharge.    "

## 2024-12-08 NOTE — PLAN OF CARE
Reason for Admission: Aseptic meningitis, POD 2 cerebral angio   Cognitive/Mentation: A/Ox 4  Neuros/CMS: Stable w/ generalized weakness, blurry peripheral vision  VS: VSS on RA, SBP goal <160.   GI/: BS audible, + flatus, no BM. Voiding adequately. Continent.  Pulmonary: LS clear  Pain: Headache, prn Tylenol and one time dose of Sumatriptan given.   Drains/Lines: R PIV SL  Skin: R groin site, dressing intact. R radial site.  Activity: Assist x 1/SBA in the room w/ . Refusing bed alarm.   Diet: Regular diet with thin liquids. Takes pills whole.   Therapies recs: Home  Discharge: Pending medical readiness  Aggression Stoplight Tool: Green  End of shift summary: Family at bedside, helpful w/ cares. Seizure precautions maintained.

## 2024-12-08 NOTE — PLAN OF CARE
Physical Therapy Discharge Summary    Reason for therapy discharge:    All goals and outcomes met, no further needs identified.    Progress towards therapy goal(s). See goals on Care Plan in Epic electronic health record for goal details.  Goals met    Therapy recommendation(s):    Continue home exercise program. PT Discharge Planning:    PT Plan: Discharge  PT Discharge Recommendation (DC Rec): home with assist  PT Rationale for DC Rec: Pt is moving near baseline mobility, currently limited by slight imbalance and impaired activity tolerance at this time. Recommend pt return home with assist of significant other for stand by assist on stairs and with longer distance ambulation.  PT Brief overview of current status: SBA w/o an AD; Goals of therapy will be to address safe mobility and make recs for d/c to next level of care. Pt and RN will continue to follow all falls risk precautions as documented by RN staff while hospitalized.    Recommendation above provided by last treating therapist.

## 2024-12-08 NOTE — PROGRESS NOTES
Neurology Note  [facility]    Patient Name: Keerthi Riddle  MRN: [id number]  : 1980      Subjective:  Her nurse contacted me stating that there was some concern of left-sided leg weakness so I evaluated her.  However there was no weakness on evaluation.  She got a bad headache yesterday and took Imitrex.  Her headache improved significantly.  She took a subsequent second dose and now her headache is 0 out of 10.  She denies any numbness.  She does have some blurry vision.  Denies any neck stiffness or fevers.    Summary:  Keerthi Riddle is a 44 year old female who presents to [facility] department for evaluation of headache since .     Keerthi has a history of nocturnal seizures for which she is maintained on Keppra, and while on Keppra has not had issues with seizures.  Seizures initially diagnosed in  with some overlap concern of potential alcohol abuse at that time.     Patient had new onset headache starting .  Sudden onset, not associated with trauma or activity.     Reports she had pain throughout her whole head, primarily bifrontal and at base of neck.  With persistent waxing and waning headache since , she contacted the nurse line through her [facility] on , was seen  at urgent care and prescribed acetaminophen, December 3 for follow-up with her primary care provider.  At December 3 visit describes some stabbing pain behind her left eye and tingling of her scalp as well as tightness in her hamstring when she would have headache pain.  Was rotating Advil 3 600 mg with Tylenol 650 mg every 6 hours.  At that visit she also reported having a stiff neck with her headache, noted a plan to follow with a chiropractor after PCP visit.  She received Toradol for her headache.     At chiropractor visit integrative health visit, it was noted that her headache was associated with an aura.  Came  on while her head was flexed while dying her hair, and with her abrupt pain she subsequently had an episode of emesis.     After chiropractor visit, she was placed in position with her head rotated and extended for 30 seconds and reported feeling dizzy.  Was felt to be a contraindication for cervical spine manipulation given positive vertebral artery test.  She also noted pulsations with her headache and a bilateral sciatica type pain.  Chiropractor recommended follow-up with neurology again with her sudden onset headache symptoms.     When she was able to get her neurology visit scheduled, it was not available for 2 weeks.  Contacted her family medicine clinic 12/4/2024 regarding this, and subsequently presented to the emergency department for evaluation.     In the emergency department she had a CT which was unrevealing, underwent lumbar puncture with red cells throughout CSF.  Stroke neurology was contacted and recommended imaging studies including MRI, MRV.  These were all unrevealing for any occult bleeding source or venous thrombosis.  No evidence of ischemia, mass, hemorrhage either intraparenchymal or intraventricular, no dissection, no significant stenoses, no aneurysms, no vascular malformations.  Past Medical History:  No past medical history on file.  Past Surgical History:  No past surgical history on file.  Medications:  Current Outpatient Rx   Medication Sig Dispense Refill    acetaminophen (TYLENOL) 500 MG tablet Take 1-2 tablets (500-1,000 mg) by mouth every 6 hours as needed for mild pain. 100 tablet 0    SUMAtriptan (IMITREX) 25 MG tablet Take 1 tablet (25 mg) by mouth at onset of headache for migraine. May repeat in 2 hours. Max 8 tablets/24 hours. 60 tablet 0     Allergies:  Allergies   Allergen Reactions    Gluten Meal Diarrhea    Milk (Cow) GI Disturbance    Penicillins Nausea and Vomiting and GI Disturbance     Specifically amoxicillin, reports has been an issue with all penicillins in recent  years.    Wheat [Wheat] Unknown     Family History:  No family history on file.  Social History:  Social History     Socioeconomic History    Marital status: Single     Spouse name: Not on file    Number of children: Not on file    Years of education: Not on file    Highest education level: Not on file   Occupational History    Not on file   Tobacco Use    Smoking status: Not on file    Smokeless tobacco: Not on file   Substance and Sexual Activity    Alcohol use: Not on file    Drug use: Not on file    Sexual activity: Not on file   Other Topics Concern    Not on file   Social History Narrative    Not on file     Social Drivers of Health     Financial Resource Strain: Low Risk  (12/5/2024)    Financial Resource Strain     Within the past 12 months, have you or your family members you live with been unable to get utilities (heat, electricity) when it was really needed?: No   Food Insecurity: Low Risk  (12/5/2024)    Food Insecurity     Within the past 12 months, did you worry that your food would run out before you got money to buy more?: No     Within the past 12 months, did the food you bought just not last and you didn t have money to get more?: No   Transportation Needs: Low Risk  (12/5/2024)    Transportation Needs     Within the past 12 months, has lack of transportation kept you from medical appointments, getting your medicines, non-medical meetings or appointments, work, or from getting things that you need?: No   Physical Activity: Not on File (8/26/2019)    Received from [facility]    Physical Activity     Physical Activity: 0   Stress: Not on File (8/26/2019)    Received from [facility]    Stress     Stress: 0   Social Connections: Not on File (8/26/2019)    Received from [facility]    Social Connections     Social Connections and Isolation: 0   Interpersonal Safety: Low Risk  (12/8/2024)    Interpersonal Safety     Do you feel physically and emotionally safe where you currently live?: Yes     Within the past 12 months,  have you been hit, slapped, kicked or otherwise physically hurt by someone?: No     Within the past 12 months, have you been humiliated or emotionally abused in other ways by your partner or ex-partner?: No   Recent Concern: Interpersonal Safety - High Risk (12/7/2024)    Interpersonal Safety     Do you feel physically and emotionally safe where you currently live?: No     Within the past 12 months, have you been hit, slapped, kicked or otherwise physically hurt by someone?: No     Within the past 12 months, have you been humiliated or emotionally abused in other ways by your partner or ex-partner?: No   Housing Stability: Low Risk  (12/5/2024)    Housing Stability     Do you have housing? : Yes     Are you worried about losing your housing?: No         Vital Signs:  There were no vitals taken for this visit.      Neurological examination  Mental Status: The patient is alert and oriented. Recent memory is normal. The person is  attentive with normal concentration. Language is fluent. Speech is of normal brady and  character. The speech is nondysarthric. Fund of knowledge appears normal  Cranial Nerve I: Not tested.  Cranial Nerve II: The pupils are equally round and reactive to light.  Cranial Nerves III, IV, VI: The extraocular movements are full in all directions of gaze without  ophthalmoplegia or nystagmus.  Cranial Nerve V: Light touch is intact and symmetric in the V1, V2, V3 divisions of both  trigeminal nerves.  Cranial Nerve VII: Facial movements are symmetric.  Cranial Nerve XI: Normal shoulder shrug.  Muscle Strength: The strength was 5/5 in upper and lower extremities bilaterally.  Sensory: The sensory examination is normal for light touch bilaterally and symmetrically.  Cerebellar: The cerebellar examination is normal to finger to nose test.    Review of Diagnostics:      MRV Brain wo & w Contrast    Result Date: 12/5/2024  EXAM: MRV BRAIN  W/O and W CONTRAST LOCATION: [facility]  HOSPITAL DATE: 12/5/2024 INDICATION:     IMPRESSION: HEAD MRV: 1.  No dural venous sinus thrombosis or significant stenosis.    MR Brain w/o & w Contrast    Result Date: 12/5/2024  EXAM: MR BRAIN W/O and W CONTRAST, MRA NECK (CAROTIDS) W/O and W CONTRAST, MRA BRAIN ([place]) W/O CONTRAST LOCATION: [facility] DATE: 12/5/2024 INDICATION:     IMPRESSION: HEAD MRI: 1.  No discrete mass lesion, hemorrhage or focal area of acute ischemia. 2.  No significant abnormal signal or abnormal enhancement visualized. HEAD MRA: 1.  No discrete large vessel occlusion, significant stenosis, aneurysm or high flow vascular malformation involving the arteries of the [place]. NECK MRA: 1.  Normal configuration of the great vessels off the aortic arch with no significant stenosis of their origins. 2.  No significant stenosis or irregularity involving the arteries of the neck. 3.  No radiographic evidence of dissection.    MRA Angiogram Head w/o Contrast    Result Date: 12/5/2024  EXAM: MR BRAIN W/O and W CONTRAST, MRA NECK (CAROTIDS) W/O and W CONTRAST, MRA BRAIN ([place]) W/O CONTRAST LOCATION: [facility] DATE: 12/5/2024 INDICATION:     IMPRESSION: HEAD MRI: 1.  No discrete mass lesion, hemorrhage or focal area of acute ischemia. 2.  No significant abnormal signal or abnormal enhancement visualized. HEAD MRA: 1.  No discrete large vessel occlusion, significant stenosis, aneurysm or high flow vascular malformation involving the arteries of the [place]. NECK MRA: 1.  Normal configuration of the great vessels off the aortic arch with no significant stenosis of their origins. 2.  No significant stenosis or irregularity involving the arteries of the neck. 3.  No radiographic evidence of dissection.    .    CT Head w/o Contrast    Result Date: 12/4/2024  EXAM: CT HEAD W/O CONTRAST, CTA HEAD NECK W CONTRAST LOCATION: [facility]  DATE: 12/4/2024 INDICATION: Headache and neck pain COMPARISON: None CONTRAST: 67mL Isovue 370 TECHNIQUE: Head and neck CT angiogram with IV contrast. Noncontrast head CT followed by axial helical CT images of the head and neck vessels obtained during the arterial phase of intravenous contrast administration. Axial 2D reconstructed images and multiplanar 3D MIP reconstructed images of the head and neck vessels were performed by the technologist. Dose reduction techniques were used. All stenosis measurements made according to NASCET criteria unless otherwise specified. FINDINGS: NONCONTRAST HEAD CT: INTRACRANIAL CONTENTS: Portions of the posterior fossa are obscured by streak artifact. No acute intracranial hemorrhage, extraaxial collection, or mass effect. No evidence of an acute transcortical confluent infarct. Normal parenchymal attenuation. Normal ventricles and sulci for age. VISUALIZED ORBITS/SINUSES/MASTOIDS: No acute intraorbital finding. No significant paranasal sinus or mastoid mucosal disease. BONES/SOFT TISSUES: No acute abnormality. HEAD CTA: ANTERIOR CIRCULATION: No high-grade stenosis, occlusion, aneurysm, or high-flow vascular malformation. A severely hypoplastic P1 segment of the left posterior cerebral artery with a patent left posterior communicating artery. POSTERIOR CIRCULATION: No high-grade stenosis, occlusion, aneurysm, or high-flow vascular malformation. A hypoplastic right vertebral artery predominantly terminating as the right posterior inferior cerebellar artery. DURAL VENOUS SINUSES: Expected enhancement of the major dural venous sinuses. Please note that this exam is not specifically tailored for the assessment of the intracranial venous structures. NECK CTA: RIGHT CAROTID: No hemodynamically significant stenosis or dissection. LEFT CAROTID: No hemodynamically significant stenosis or dissection. VERTEBRAL ARTERIES: No focal high-grade stenosis or dissection. Balanced vertebral arteries.  AORTIC ARCH: Incidentally noted aberrant right subclavian artery without aneurysmal dilatation at its origin. A common origin of both common carotid arteries. Patent visualized thoracic aorta, aortic arch, and proximal great vessels without significant atherosclerotic disease or stenosis. NONVASCULAR STRUCTURES: Straightening of the normal cervical lordosis with 2-3 mm likely degenerative anterolisthesis at C6-C7 in the setting of mild-moderate left C5-C7 facet arthrosis. Multilevel interbody degenerative change including mild-moderate intervertebral disc height loss at C4-C5 and C6-C7, mild at C5-C6. While assessment is limited due to streak artifact, there is likely mild spinal canal stenosis at C5-C6. Moderate left C5-C7 foraminal narrowing, mild-moderate at C4-C5 on the left. Clear  visualized lungs.     IMPRESSION: HEAD CT: 1.  No acute intracranial abnormality. HEAD CTA: 1.  No large vessel occlusion, high-grade stenosis, aneurysm, or high-flow vascular malformation. NECK CTA: 1.  No hemodynamically significant stenosis or dissection in the neck vessels. 2.  Incidental aberrant right subclavian artery. 3.  Multilevel cervical spondylosis, as described.      IMPRESSION: HEAD CT: 1.  No acute intracranial abnormality. HEAD CTA: 1.  No large vessel occlusion, high-grade stenosis, aneurysm, or high-flow vascular malformation. NECK CTA: 1.  No hemodynamically significant stenosis or dissection in the neck vessels. 2.  Incidental aberrant right subclavian artery. 3.  Multilevel cervical spondylosis, as described.        Complete Blood Count:    Recent Labs   Lab Test 12/04/24 2018   WBC 6.2   RBC 4.23   HGB 13.3   HCT 39.9      LYMPH 29          Recent Labs   Lab Test 12/04/24 2018   WBC 6.2   HGB 13.3   MCV 94         Recent Labs   Lab Test 12/07/24  1036 12/06/24  1607 12/04/24 2018     --  139   POTASSIUM 3.6  --  4.1   CHLORIDE 107  --  101   CO2 21*  --  27   BUN 9.9  --  12.2   CR 0.76   --  1.12*   ANIONGAP 11  --  11   HUMZA 8.7*  --  9.7   * 81 99     CMP:  No lab results found in last 7 days.      Impression:  Ms. Riddle is a 44 year old With a past medical history of seizures was admitted December for ongoing headache since November 26.  She has had nausea and vomiting with these headaches.  Extensive workup has been unremarkable besides possible aseptic meningitis noted on CSF testing.  Differential diagnosis can be migraine related or due to aseptic meningitis.    Plan:    -Continue sumatriptan as needed for migraines.  This has helped her significantly.  -Stroke team is following for possible reversible cerebral vasoconstriction syndrome and completed angiogram but results are still pending but was told results were normal.  -Aseptic meningitis: This can be the cause of headache and treatment is mostly supportive.  -History of seizures: Continue Keppra 750 mg daily.  -I gave her my card and can follow-up with me as outpatient.    Viral Benton MD  Neurology      Thank you very much  for allowing me to participate in the care of this patient.

## 2024-12-08 NOTE — PLAN OF CARE
"Goal Outcome Evaluation:                      Reason for Admission: thunderclap headache with vomiting, aseptic meningitis, POD1 cerebral angio  Cognitive/Mentation: A/Ox 4  Neuros/CMS: Intact ex continues to have bilateral peripheral blurry vision, Viral SCHWARZ stroke neuro provider aware. No change to plan of care. CT discontinued per neuro team.   VS:/76 (BP Location: Left arm, Patient Position: Semi-Edward's, Cuff Size: Adult Regular)   Pulse 61   Temp 98.4  F (36.9  C) (Axillary)   Resp 16   Ht 1.676 m (5' 6\")   Wt 70.8 kg (156 lb)   SpO2 99%   BMI 25.18 kg/m      /GI: Voiding,Continent. Last BM 12/5.   Pulmonary: LS clear.  Pain:. Headache, managed with PRN Tylenol and one time dose of imitrex.   Drains/Lines: IV  Skin: R radial and R groin site, CMS intact.  Activity: SBA with  in the room. Pt refusing bed alarms and fall intervention, educated on fall risk Pt and  verbalized understanding and refused. Pt agreed to have bed alarm on when  isn't present.   Diet: Reg, thin, takes pills whole   Discharge: pending      Aggression Stoplight Tool: green     End of shift summary: Received IV fluids this shift.  at bedside, supportive. CMS To R groin and R radial site intact.   "

## 2024-12-08 NOTE — DISCHARGE SUMMARY
[facility]  Hospitalist Discharge Summary      Date of Admission:  12/4/2024  Date of Discharge:  12/8/2024  Discharging Provider: Caprice Jama MD    Discharge Diagnoses   Intractable headache due to migraine vs aseptic meningitis  Seizure disorder    Follow-ups Needed After Discharge   Follow-up Appointments       Follow-up and recommended labs and tests       Follow up with General Neurology in 4 weeks ([phone])              Discharge Disposition   Discharged to home  Condition at discharge: Stable    Hospital Course   Keerthi Riddle is a 44 year old female with hx of seizures who was admitted on 12/4/2024 for evaluation of ongoing headache with meningismus    Brief HPI  Presented with 1 week hx of ongoing headache after developing a thunderclap headache on 11/26; initially associated with nausea/vomiting, tinnitus, neck stiffness, and sensation of tightness in her hamstrings when standing without clear sciatica; positional worsening.    In the ED, VSS. Labs significant only for Cr 1.12. Multiple imaging studies this admission (head CT, CTA, brain MRI, MRA/MRV) negative for acute pathology. Underwent LP in the ED showing xanthochromic CSF.  Meningitis/encephalitis panel negative. UA bland. CRP normal. Recent Keppra level therapeutic.    General Neurology as well as Stroke Neurology were consulted this admission. Etiology for headache was ultimately felt to be due to migraine vs aseptic meningitis; in either case, treatment is supportive. Cerebral angiogram this was admission was negative for acute pathology.    She is being discharged with PRN Tylenol and sumatriptan. She continues on PTA Keppra as previously prescribed. Note, pt takes many herbal supplements, and was discontinue these supplements until further discussion with her PCP      Consultations This Hospital Stay   NEUROLOGY INTERVENTIONAL ADULT IP CONSULT  NEUROLOGY IP STROKE CONSULT  NEUROLOGY IP  CONSULT  PHYSICAL THERAPY ADULT IP CONSULT  CARE MANAGEMENT / SOCIAL WORK IP CONSULT      Code Status   Full Code    Time Spent on this Encounter   I, Caprice Jama MD, personally saw the patient today and spent 45 minutes discharging this patient.       Caprice Jama MD  [facility]  [address] [address]  Phone: [phone]  ______________________________________________________________________    Physical Exam   Vital Signs: Temp: 98.3  F (36.8  C) Temp src: Oral BP: 109/78 Pulse: 80   Resp: 16 SpO2: 96 % O2 Device: None (Room air)    Weight: 156 lbs 0 oz    Constitutional: Resting in bed, NAD  HEENT: Sclera white, conjunctiva clear, EOMI, MMM  Respiratory: Breathing non-labored. Lungs CTAB - no wheezes/crackles/rhonchi  Cardiovascular: Heart RRR, no m/r/g. No pedal edema.   GI: +BS. Abd soft/NT  : Not examined  Skin: Warm and dry. No rash.  Musculoskeletal: Normal muscle bulk and tone  Neurologic: Alert and appropriate. MAXWELL  Psychiatric: Calm and cooperative    Primary Care Physician   Betsy Meier    Discharge Orders      Reason for your hospital stay    Severe and persistent headache necessitating a full neurological evaluation which returned negative. Your infectious panel returned negative, and are not contagious     Follow-up and recommended labs and tests     Follow up with General Neurology in 4 weeks ([phone])     Activity    Your activity upon discharge: activity as tolerated     Diet    Follow this diet upon discharge: Regular diet. Avoid non-prescribed herbal supplements until further discussion with your primary care provider and neurologist     Stroke Hospital Follow Up (for neurologist use only)    [employer] will call you to coordinate care as prescribed by your provider. If you don t hear from a representative within 2 business days, please call [phone].         Significant Results and Procedures   Most Recent  3 CBC's:  Recent Labs   Lab Test 12/04/24 2018   WBC 6.2   HGB 13.3   MCV 94        Most Recent 3 BMP's:  Recent Labs   Lab Test 12/07/24  1036 12/06/24  1607 12/04/24 2018     --  139   POTASSIUM 3.6  --  4.1   CHLORIDE 107  --  101   CO2 21*  --  27   BUN 9.9  --  12.2   CR 0.76  --  1.12*   ANIONGAP 11  --  11   HUMZA 8.7*  --  9.7   * 81 99   ,   Results for orders placed or performed during the hospital encounter of 12/04/24   CT Head w/o Contrast    Narrative    EXAM: CT HEAD W/O CONTRAST, CTA HEAD NECK W CONTRAST  LOCATION: [facility]  DATE: 12/4/2024    INDICATION: Headache and neck pain  COMPARISON: None  CONTRAST: 67mL Isovue 370  TECHNIQUE: Head and neck CT angiogram with IV contrast. Noncontrast head CT followed by axial helical CT images of the head and neck vessels obtained during the arterial phase of intravenous contrast administration. Axial 2D reconstructed images and   multiplanar 3D MIP reconstructed images of the head and neck vessels were performed by the technologist. Dose reduction techniques were used. All stenosis measurements made according to NASCET criteria unless otherwise specified.    FINDINGS:   NONCONTRAST HEAD CT:   INTRACRANIAL CONTENTS: Portions of the posterior fossa are obscured by streak artifact. No acute intracranial hemorrhage, extraaxial collection, or mass effect. No evidence of an acute transcortical confluent infarct. Normal parenchymal attenuation.   Normal ventricles and sulci for age.     VISUALIZED ORBITS/SINUSES/MASTOIDS: No acute intraorbital finding. No significant paranasal sinus or mastoid mucosal disease.     BONES/SOFT TISSUES: No acute abnormality.    HEAD CTA:  ANTERIOR CIRCULATION: No high-grade stenosis, occlusion, aneurysm, or high-flow vascular malformation. A severely hypoplastic P1 segment of the left posterior cerebral artery with a patent left posterior communicating artery.    POSTERIOR CIRCULATION: No  high-grade stenosis, occlusion, aneurysm, or high-flow vascular malformation. A hypoplastic right vertebral artery predominantly terminating as the right posterior inferior cerebellar artery.     DURAL VENOUS SINUSES: Expected enhancement of the major dural venous sinuses. Please note that this exam is not specifically tailored for the assessment of the intracranial venous structures.    NECK CTA:  RIGHT CAROTID: No hemodynamically significant stenosis or dissection.    LEFT CAROTID: No hemodynamically significant stenosis or dissection.    VERTEBRAL ARTERIES: No focal high-grade stenosis or dissection. Balanced vertebral arteries.    AORTIC ARCH: Incidentally noted aberrant right subclavian artery without aneurysmal dilatation at its origin. A common origin of both common carotid arteries. Patent visualized thoracic aorta, aortic arch, and proximal great vessels without significant   atherosclerotic disease or stenosis.    NONVASCULAR STRUCTURES: Straightening of the normal cervical lordosis with 2-3 mm likely degenerative anterolisthesis at C6-C7 in the setting of mild-moderate left C5-C7 facet arthrosis. Multilevel interbody degenerative change including mild-moderate   intervertebral disc height loss at C4-C5 and C6-C7, mild at C5-C6. While assessment is limited due to streak artifact, there is likely mild spinal canal stenosis at C5-C6. Moderate left C5-C7 foraminal narrowing, mild-moderate at C4-C5 on the left. Clear   visualized lungs.      Impression    IMPRESSION:   HEAD CT:  1.  No acute intracranial abnormality.    HEAD CTA:   1.  No large vessel occlusion, high-grade stenosis, aneurysm, or high-flow vascular malformation.    NECK CTA:  1.  No hemodynamically significant stenosis or dissection in the neck vessels.  2.  Incidental aberrant right subclavian artery.  3.  Multilevel cervical spondylosis, as described.   CT Head Neck Angio w/o & w Contrast    Narrative    EXAM: CT HEAD W/O CONTRAST, CTA HEAD  NECK W CONTRAST  LOCATION: [facility]  DATE: 12/4/2024    INDICATION: Headache and neck pain  COMPARISON: None  CONTRAST: 67mL Isovue 370  TECHNIQUE: Head and neck CT angiogram with IV contrast. Noncontrast head CT followed by axial helical CT images of the head and neck vessels obtained during the arterial phase of intravenous contrast administration. Axial 2D reconstructed images and   multiplanar 3D MIP reconstructed images of the head and neck vessels were performed by the technologist. Dose reduction techniques were used. All stenosis measurements made according to NASCET criteria unless otherwise specified.    FINDINGS:   NONCONTRAST HEAD CT:   INTRACRANIAL CONTENTS: Portions of the posterior fossa are obscured by streak artifact. No acute intracranial hemorrhage, extraaxial collection, or mass effect. No evidence of an acute transcortical confluent infarct. Normal parenchymal attenuation.   Normal ventricles and sulci for age.     VISUALIZED ORBITS/SINUSES/MASTOIDS: No acute intraorbital finding. No significant paranasal sinus or mastoid mucosal disease.     BONES/SOFT TISSUES: No acute abnormality.    HEAD CTA:  ANTERIOR CIRCULATION: No high-grade stenosis, occlusion, aneurysm, or high-flow vascular malformation. A severely hypoplastic P1 segment of the left posterior cerebral artery with a patent left posterior communicating artery.    POSTERIOR CIRCULATION: No high-grade stenosis, occlusion, aneurysm, or high-flow vascular malformation. A hypoplastic right vertebral artery predominantly terminating as the right posterior inferior cerebellar artery.     DURAL VENOUS SINUSES: Expected enhancement of the major dural venous sinuses. Please note that this exam is not specifically tailored for the assessment of the intracranial venous structures.    NECK CTA:  RIGHT CAROTID: No hemodynamically significant stenosis or dissection.    LEFT CAROTID: No hemodynamically significant stenosis  or dissection.    VERTEBRAL ARTERIES: No focal high-grade stenosis or dissection. Balanced vertebral arteries.    AORTIC ARCH: Incidentally noted aberrant right subclavian artery without aneurysmal dilatation at its origin. A common origin of both common carotid arteries. Patent visualized thoracic aorta, aortic arch, and proximal great vessels without significant   atherosclerotic disease or stenosis.    NONVASCULAR STRUCTURES: Straightening of the normal cervical lordosis with 2-3 mm likely degenerative anterolisthesis at C6-C7 in the setting of mild-moderate left C5-C7 facet arthrosis. Multilevel interbody degenerative change including mild-moderate   intervertebral disc height loss at C4-C5 and C6-C7, mild at C5-C6. While assessment is limited due to streak artifact, there is likely mild spinal canal stenosis at C5-C6. Moderate left C5-C7 foraminal narrowing, mild-moderate at C4-C5 on the left. Clear   visualized lungs.      Impression    IMPRESSION:   HEAD CT:  1.  No acute intracranial abnormality.    HEAD CTA:   1.  No large vessel occlusion, high-grade stenosis, aneurysm, or high-flow vascular malformation.    NECK CTA:  1.  No hemodynamically significant stenosis or dissection in the neck vessels.  2.  Incidental aberrant right subclavian artery.  3.  Multilevel cervical spondylosis, as described.   MR Brain w/o & w Contrast    Narrative    EXAM: MR BRAIN W/O and W CONTRAST, MRA NECK (CAROTIDS) W/O and W CONTRAST, MRA BRAIN ([place]) W/O CONTRAST  LOCATION: [facility]  DATE: 12/5/2024    INDICATION: Headache, abnormal lumbar puncture, concern for occult subarachnoid hemorrhage  COMPARISON: 12/4/2024.  CONTRAST: 7 mlGadavist  TECHNIQUE:   1) Routine multiplanar multisequence head MRI without intravenous contrast.  2) 3D time-of-flight head MRA without intravenous contrast.  3) Neck MRA without and with IV contrast. Stenosis measurements made according to NASCET criteria  unless otherwise specified.    FINDINGS:  HEAD MRI:  INTRACRANIAL CONTENTS: On the diffusion images there is no evidence of acute ischemia or restricted diffusion. There is no discrete mass lesion or midline shift. On the susceptibility weighted images there is no evidence of an extra-axial fluid   collection, intraparenchymal hemorrhage or intraventricular hemorrhage. There are appropriate flow voids seen within the cavernous portions of the internal carotid arteries and the basilar artery. On the T2-weighted images there are a few tiny foci of   high signal within the periventricular and subcortical white matter most likely incidental in nature. Following the administration of contrast no abnormal enhancement visualized. The ventricular system, basal cisterns and the cortical sulci are within   normal limits for age. There is no evidence of cerebellar tonsillar ectopia. The corpus callosum and the sella region have appropriate configuration and signal intensity for the patient's age. The orbit regions are unremarkable. There is no significant   paranasal sinus disease. The air cells and the middle ear regions are clear.     HEAD MRA:   ANTERIOR CIRCULATION: No stenosis/occlusion, aneurysm, or high flow vascular malformation. A fetal origin to the left posterior cerebral artery.    POSTERIOR CIRCULATION: No stenosis/occlusion, aneurysm, or high flow vascular malformation. The left vertebral artery is dominant but both vertebral arteries connect up to the basilar artery.     NECK MRA:   RIGHT CAROTID: No measurable stenosis or dissection.    LEFT CAROTID: No measurable stenosis or dissection.    VERTEBRAL ARTERIES: No focal stenosis or dissection. Left vertebral artery dominant but both vertebral arteries are patent throughout the neck region.    AORTIC ARCH: Classic aortic arch anatomy with no significant stenosis at the origin of the great vessels.      Impression    IMPRESSION:  HEAD MRI:   1.  No discrete mass  lesion, hemorrhage or focal area of acute ischemia.  2.  No significant abnormal signal or abnormal enhancement visualized.    HEAD MRA:   1.  No discrete large vessel occlusion, significant stenosis, aneurysm or high flow vascular malformation involving the arteries of the [place].    NECK MRA:  1.  Normal configuration of the great vessels off the aortic arch with no significant stenosis of their origins.  2.  No significant stenosis or irregularity involving the arteries of the neck.  3.  No radiographic evidence of dissection.   MRA Angiogram Head w/o Contrast    Narrative    EXAM: MR BRAIN W/O and W CONTRAST, MRA NECK (CAROTIDS) W/O and W CONTRAST, MRA BRAIN ([place]) W/O CONTRAST  LOCATION: [facility]  DATE: 12/5/2024    INDICATION: Headache, abnormal lumbar puncture, concern for occult subarachnoid hemorrhage  COMPARISON: 12/4/2024.  CONTRAST: 7 mlGadavist  TECHNIQUE:   1) Routine multiplanar multisequence head MRI without intravenous contrast.  2) 3D time-of-flight head MRA without intravenous contrast.  3) Neck MRA without and with IV contrast. Stenosis measurements made according to NASCET criteria unless otherwise specified.    FINDINGS:  HEAD MRI:  INTRACRANIAL CONTENTS: On the diffusion images there is no evidence of acute ischemia or restricted diffusion. There is no discrete mass lesion or midline shift. On the susceptibility weighted images there is no evidence of an extra-axial fluid   collection, intraparenchymal hemorrhage or intraventricular hemorrhage. There are appropriate flow voids seen within the cavernous portions of the internal carotid arteries and the basilar artery. On the T2-weighted images there are a few tiny foci of   high signal within the periventricular and subcortical white matter most likely incidental in nature. Following the administration of contrast no abnormal enhancement visualized. The ventricular system, basal cisterns and the  cortical sulci are within   normal limits for age. There is no evidence of cerebellar tonsillar ectopia. The corpus callosum and the sella region have appropriate configuration and signal intensity for the patient's age. The orbit regions are unremarkable. There is no significant   paranasal sinus disease. The air cells and the middle ear regions are clear.     HEAD MRA:   ANTERIOR CIRCULATION: No stenosis/occlusion, aneurysm, or high flow vascular malformation. A fetal origin to the left posterior cerebral artery.    POSTERIOR CIRCULATION: No stenosis/occlusion, aneurysm, or high flow vascular malformation. The left vertebral artery is dominant but both vertebral arteries connect up to the basilar artery.     NECK MRA:   RIGHT CAROTID: No measurable stenosis or dissection.    LEFT CAROTID: No measurable stenosis or dissection.    VERTEBRAL ARTERIES: No focal stenosis or dissection. Left vertebral artery dominant but both vertebral arteries are patent throughout the neck region.    AORTIC ARCH: Classic aortic arch anatomy with no significant stenosis at the origin of the great vessels.      Impression    IMPRESSION:  HEAD MRI:   1.  No discrete mass lesion, hemorrhage or focal area of acute ischemia.  2.  No significant abnormal signal or abnormal enhancement visualized.    HEAD MRA:   1.  No discrete large vessel occlusion, significant stenosis, aneurysm or high flow vascular malformation involving the arteries of the [place].    NECK MRA:  1.  Normal configuration of the great vessels off the aortic arch with no significant stenosis of their origins.  2.  No significant stenosis or irregularity involving the arteries of the neck.  3.  No radiographic evidence of dissection.   MRA Angiogram Neck w/o & w Contrast    Narrative    EXAM: MR BRAIN W/O and W CONTRAST, MRA NECK (CAROTIDS) W/O and W CONTRAST, MRA BRAIN ([place]) W/O CONTRAST  LOCATION: [facility]  DATE:  12/5/2024    INDICATION: Headache, abnormal lumbar puncture, concern for occult subarachnoid hemorrhage  COMPARISON: 12/4/2024.  CONTRAST: 7 mlGadavist  TECHNIQUE:   1) Routine multiplanar multisequence head MRI without intravenous contrast.  2) 3D time-of-flight head MRA without intravenous contrast.  3) Neck MRA without and with IV contrast. Stenosis measurements made according to NASCET criteria unless otherwise specified.    FINDINGS:  HEAD MRI:  INTRACRANIAL CONTENTS: On the diffusion images there is no evidence of acute ischemia or restricted diffusion. There is no discrete mass lesion or midline shift. On the susceptibility weighted images there is no evidence of an extra-axial fluid   collection, intraparenchymal hemorrhage or intraventricular hemorrhage. There are appropriate flow voids seen within the cavernous portions of the internal carotid arteries and the basilar artery. On the T2-weighted images there are a few tiny foci of   high signal within the periventricular and subcortical white matter most likely incidental in nature. Following the administration of contrast no abnormal enhancement visualized. The ventricular system, basal cisterns and the cortical sulci are within   normal limits for age. There is no evidence of cerebellar tonsillar ectopia. The corpus callosum and the sella region have appropriate configuration and signal intensity for the patient's age. The orbit regions are unremarkable. There is no significant   paranasal sinus disease. The air cells and the middle ear regions are clear.     HEAD MRA:   ANTERIOR CIRCULATION: No stenosis/occlusion, aneurysm, or high flow vascular malformation. A fetal origin to the left posterior cerebral artery.    POSTERIOR CIRCULATION: No stenosis/occlusion, aneurysm, or high flow vascular malformation. The left vertebral artery is dominant but both vertebral arteries connect up to the basilar artery.     NECK MRA:   RIGHT CAROTID: No measurable  stenosis or dissection.    LEFT CAROTID: No measurable stenosis or dissection.    VERTEBRAL ARTERIES: No focal stenosis or dissection. Left vertebral artery dominant but both vertebral arteries are patent throughout the neck region.    AORTIC ARCH: Classic aortic arch anatomy with no significant stenosis at the origin of the great vessels.      Impression    IMPRESSION:  HEAD MRI:   1.  No discrete mass lesion, hemorrhage or focal area of acute ischemia.  2.  No significant abnormal signal or abnormal enhancement visualized.    HEAD MRA:   1.  No discrete large vessel occlusion, significant stenosis, aneurysm or high flow vascular malformation involving the arteries of the [place].    NECK MRA:  1.  Normal configuration of the great vessels off the aortic arch with no significant stenosis of their origins.  2.  No significant stenosis or irregularity involving the arteries of the neck.  3.  No radiographic evidence of dissection.   MRV Brain wo & w Contrast    Narrative    EXAM: MRV BRAIN  W/O and W CONTRAST  LOCATION: [facility]  DATE: 12/5/2024    INDICATION: Headache, abnormal lumbar puncture, concern for occult subarachnoid hemorrhage  COMPARISON: None.  CONTRAST: 7 ml Gadavist  TECHNIQUE:   Phase contrast and 2-D time-of-flight head MRV performed after administration of intravenous contrast.    FINDINGS:  HEAD MRV:   DURAL SINUSES: No significant stenosis or occlusion. Balanced transverse and sigmoid sinuses.    INTERNAL CEREBRAL VEINS: No significant stenosis or occlusion.      MAJOR CORTICAL VENOUS BRANCHES: No significant stenosis or occlusion.      Impression    IMPRESSION:  HEAD MRV:   1.  No dural venous sinus thrombosis or significant stenosis.       Discharge Medications   Discharge Medication List as of 12/8/2024 12:23 PM        START taking these medications    Details   !! acetaminophen (TYLENOL) 500 MG tablet Take 1-2 tablets (500-1,000 mg) by mouth every 6 hours  as needed for mild pain., Disp-100 tablet, R-0, E-Prescribe       !! - Potential duplicate medications found. Please discuss with provider.        CONTINUE these medications which have CHANGED    Details   SUMAtriptan (IMITREX) 25 MG tablet Take 1 tablet (25 mg) by mouth at onset of headache for migraine. May repeat in 2 hours. Max 2 tablets per day and per week, Disp-60 tablet, R-0, Local Print           CONTINUE these medications which have NOT CHANGED    Details   5-HTP CAPS Take 1 Dose by mouth at bedtime., Historical      !! acetaminophen (TYLENOL) 325 MG tablet Take 650 mg by mouth every 6 hours as needed for mild pain., Historical      Acetylcysteine (NAC PO) Take 1 Dose by mouth at bedtime., Historical      Biotin 5000 MCG TABS Take 5,000 mcg by mouth three times a week. Takes a few times a week as remembers (does not take daily given high dose)., Historical      cholecalciferol (VITAMIN D3) 125 mcg (5000 units) capsule Take 125 mcg by mouth daily., Historical      Coenzyme Q10 (CO Q-10 PO) Take 1 Dose by mouth daily., Historical      CRANBERRY EXTRACT PO Take 1 Dose by mouth daily., Historical      FOLIC ACID PO Take 1 Dose by mouth daily., Historical      ibuprofen (ADVIL/MOTRIN) 200 MG tablet Take 600 mg by mouth every 4 hours as needed for pain., Historical      !! levETIRAcetam (KEPPRA) 250 MG tablet Take 250 mg by mouth every morning., Historical      !! levETIRAcetam (KEPPRA) 500 MG tablet Take 500 mg by mouth every evening., Historical      melatonin 1 MG TABS tablet Take 1 mg by mouth at bedtime., Historical      MILK THISTLE PO Take 2 capsules by mouth daily., Historical      Omega-3 Fatty Acids (FISH OIL PO) Take 1 capsule by mouth daily.  [employer]'s Supplement, Historical      Prasterone, DHEA, (DHEA PO) Take 1 Dose by mouth daily. Pure Encapsulations Brand, Historical      progesterone (PROMETRIUM) 100 MG capsule Take 100 mg by mouth at bedtime., Historical      THEANINE PO Take 1 Dose by mouth  daily., Historical      TURMERIC PO Take 600 mg by mouth daily., Historical      !! UNABLE TO FIND Take 2 tablets by mouth daily. MEDICATION NAME: Bone-Up Supplement, Historical      !! UNABLE TO FIND Take 1 Dose by mouth daily. MEDICATION NAME: Pure Encapsulations DopaPlus, Historical      !! UNABLE TO FIND Take 5 mg by mouth daily. MEDICATION NAME: Pure Encapsulations Lithium (orotate), Historical      vitamin C (ASCORBIC ACID) 1000 MG TABS Take 1,000 mg by mouth daily., Historical      !! UNABLE TO FIND Take 1 Dose by mouth daily. MEDICATION NAME: [employer] Supplement, Historical      !! UNABLE TO FIND Take 1 Dose by mouth daily. MEDICATION NAME: [place] Mushrooms, Historical      !! UNABLE TO FIND Take 1 capsule by mouth at bedtime. MEDICATION NAME: CBD + Melatonin 3 mg  --> Uses instead of melatonin 1 mg when available to purchase., Historical       !! - Potential duplicate medications found. Please discuss with provider.        Allergies   Allergies   Allergen Reactions    Gluten Meal Diarrhea    Milk (Cow) GI Disturbance    Penicillins Nausea and Vomiting and GI Disturbance     Specifically amoxicillin, reports has been an issue with all penicillins in recent years.    Wheat [Wheat] Unknown

## 2024-12-08 NOTE — PROGRESS NOTES
"   12/08/24 0900   Appointment Info   Signing Clinician's Name / Credentials (PT) Caty Giordano, PT, DPT   Living Environment   People in Home significant other;child(nigel), dependent  (teenage daughter)   Current Living Arrangements house   Home Accessibility stairs to enter home   Number of Stairs, Main Entrance greater than 10 stairs   Stair Railings, Main Entrance railings safe and in good condition   Transportation Anticipated car, drives self;family or friend will provide   Living Environment Comments Pt lives in a house with her teenage daughter and significant other. Reports stairs to enter the home with B railings   Self-Care   Usual Activity Tolerance excellent   Current Activity Tolerance good   Regular Exercise No   Equipment Currently Used at Home none   Fall history within last six months no   Activity/Exercise/Self-Care Comment Pt is typically IND with all ADL's and mobility, works as a  at baseline   General Information   Onset of Illness/Injury or Date of Surgery 12/04/24   Referring Physician Savanah Ayoub MD   Patient/Family Therapy Goals Statement (PT) \"To go back home\"   Pertinent History of Current Problem (include personal factors and/or comorbidities that impact the POC) Pt is a 44-year-old female with past medical history of chronic neck and back pain, nocturnal seizure on Keppra, IBS.  She presented because of acute onset of headache that started in November 26 when she was dying her hair with subsequent episode of vomiting.  The patient described that the headache was a thunderclap headache maximized intensity reaching within seconds to minutes, localized to a circumferential band directly superior to forehead bitemporal and retro-orbital.  Seen at urgent care for headache and December 3 negative for COVID influenza.  She presented to the ED because of the concern of not receiving the test she knees, dedicated neuroimaging including CT head without contrast, CT angiography of " head and neck, MRA, MRV and MRI of the brain with and without contrast did not reveal any evidence of vascular injury.  Lumbar puncture was performed and CSF studies showed pinky color, elevated red blood cells and proteins, elevated lymphocytes, normal glucose, total nucleated cells were 151.  She got cerebral angiogram to explore the presence of aneurysm however the DSA was negative for any vascular injury.  Meningitis panel was negative.  Aseptic meningitis was suspected.   Existing Precautions/Restrictions fall   Cognition   Affect/Mental Status (Cognition) WFL   Orientation Status (Cognition) oriented x 4   Follows Commands (Cognition) WFL   Pain Assessment   Patient Currently in Pain Yes, see Vital Sign flowsheet  (Headache pain, has been ongoing since hospitalization)   Integumentary/Edema   Integumentary/Edema no deficits were identifed   Posture    Posture Forward head position;Protracted shoulders   Range of Motion (ROM)   Range of Motion ROM is WFL   Strength (Manual Muscle Testing)   Strength (Manual Muscle Testing) Able to perform R SLR;Able to perform L SLR;Deficits observed during functional mobility   Bed Mobility   Comment, (Bed Mobility) Supine>sitting EOB completed IND   Transfers   Comment, (Transfers) Sit>stand completed IND without an AD   Gait/Stairs (Locomotion)   Comment, (Gait/Stairs) Pt ambulated w/ SBA and no AD   Balance   Balance other (describe)   Balance Comments Pt demonstrated good sitting balance, required SBA for dynamic balance without an AD   Sensory Examination   Sensory Perception patient reports no sensory changes   Coordination   Coordination no deficits were identified   Muscle Tone   Muscle Tone no deficits were identified   Clinical Impression   Criteria for Skilled Therapeutic Intervention Yes, treatment indicated   PT Diagnosis (PT) Impaired functional mobility   Influenced by the following impairments Impaired activity tolerance, imapired balance   Functional  limitations due to impairments Impaired gait and inability to complete ADL's   Clinical Presentation (PT Evaluation Complexity) stable   Clinical Presentation Rationale Clinical judgment   Clinical Decision Making (Complexity) low complexity   Planned Therapy Interventions (PT) balance training;bed mobility training;gait training;home exercise program;motor coordination training;neuromuscular re-education;patient/family education;postural re-education;ROM (range of motion);stair training;strengthening;stretching;transfer training;progressive activity/exercise;risk factor education;home program guidelines   Risk & Benefits of therapy have been explained evaluation/treatment results reviewed;care plan/treatment goals reviewed;risks/benefits reviewed;current/potential barriers reviewed;participants voiced agreement with care plan;patient;participants included;spouse/significant other   PT Total Evaluation Time   PT Eval, Low Complexity Minutes (70697) 10   Physical Therapy Goals   PT Frequency One time eval and treatment only   PT Predicted Duration/Target Date for Goal Attainment 12/08/24   PT Goals Bed Mobility;Transfers;Gait;Stairs   PT: Bed Mobility Independent;Supine to/from sit;Rolling;Bridging;Within precautions   PT: Transfers Independent;Sit to/from stand;Bed to/from chair;Within precautions   PT: Gait Supervision/stand-by assist;Greater than 200 feet;Within precautions   PT: Stairs Supervision/stand-by assist;Greater than 10 stairs;Rail on both sides   Interventions   Interventions Quick Adds Therapeutic Activity   Therapeutic Activity   Therapeutic Activities: dynamic activities to improve functional performance Minutes (06544) 10   Treatment Detail/Skilled Intervention Evaluation completed and treatment indicated. Pt ambulated without an AD and SBA, completed stair training w/ SBA, cues given for step to stair patterna nd to use B railing as able to ensure safety on stairs. Post-ambulation, pt and spouse  educated on precautions, pacing activity upon return home, walking program, and discussed return to work. All questions answered as able. Pt left supine in bed with significant other present, RN notified.   PT Discharge Planning   PT Plan Discharge   PT Discharge Recommendation (DC Rec) home with assist   PT Rationale for DC Rec Pt is moving near baseline mobility, currently limited by slight imbalance and impaired activity tolerance at this time. Recommend pt return home with assist of significant other for stand by assist on stairs and with longer distance ambulation.   PT Brief overview of current status SBA w/o an AD; Goals of therapy will be to address safe mobility and make recs for d/c to next level of care. Pt and RN will continue to follow all falls risk precautions as documented by RN staff while hospitalized.   Physical Therapy Time and Intention   Timed Code Treatment Minutes 10   Total Session Time (sum of timed and untimed services) 20

## 2024-12-10 ENCOUNTER — PATIENT OUTREACH (OUTPATIENT)
Dept: CARE COORDINATION | Facility: CLINIC | Age: 44
End: 2024-12-10
Payer: COMMERCIAL

## 2024-12-10 LAB
BACTERIA CSF CULT: NO GROWTH
GRAM STAIN RESULT: NORMAL
GRAM STAIN RESULT: NORMAL

## 2024-12-10 NOTE — PROGRESS NOTES
[facility] Contact  [facility]/Voicemail     Clinical Data: Post-Discharge Outreach     Outreach attempted x 2.  Left message on patient's voicemail, providing [facility]'s central phone number of [phone] ([phone]) for questions/concerns and/or to schedule an appt with an [facility] provider, if they do not have a PCP.      Plan:  [facility] will do no further outreaches at this time.     FERNANDO Saldana  [phone]  [facility]     *Connected Care Resource Team does NOT follow patient ongoing. Referrals are identified based on internal discharge reports and the outreach is to ensure patient has an understanding of their discharge instructions.

## 2025-06-08 ENCOUNTER — HEALTH MAINTENANCE LETTER (OUTPATIENT)
Age: 45
End: 2025-06-08

## (undated) RX ORDER — HEPARIN SODIUM 200 [USP'U]/100ML
INJECTION, SOLUTION INTRAVENOUS
Status: DISPENSED
Start: 2024-12-06

## (undated) RX ORDER — NITROGLYCERIN 5 MG/ML
VIAL (ML) INTRAVENOUS
Status: DISPENSED
Start: 2024-12-06

## (undated) RX ORDER — LIDOCAINE HYDROCHLORIDE 10 MG/ML
INJECTION, SOLUTION INFILTRATION; PERINEURAL
Status: DISPENSED
Start: 2024-12-06

## (undated) RX ORDER — VERAPAMIL HYDROCHLORIDE 2.5 MG/ML
INJECTION, SOLUTION INTRAVENOUS
Status: DISPENSED
Start: 2024-12-06

## (undated) RX ORDER — HEPARIN SODIUM 1000 [USP'U]/ML
INJECTION, SOLUTION INTRAVENOUS; SUBCUTANEOUS
Status: DISPENSED
Start: 2024-12-06

## (undated) RX ORDER — FENTANYL CITRATE 50 UG/ML
INJECTION, SOLUTION INTRAMUSCULAR; INTRAVENOUS
Status: DISPENSED
Start: 2024-12-06